# Patient Record
Sex: FEMALE | Race: BLACK OR AFRICAN AMERICAN | Employment: UNEMPLOYED | URBAN - METROPOLITAN AREA
[De-identification: names, ages, dates, MRNs, and addresses within clinical notes are randomized per-mention and may not be internally consistent; named-entity substitution may affect disease eponyms.]

---

## 2019-01-20 ENCOUNTER — HOSPITAL ENCOUNTER (EMERGENCY)
Facility: HOSPITAL | Age: 29
Discharge: HOME | End: 2019-01-20

## 2019-01-20 VITALS
WEIGHT: 165 LBS | HEIGHT: 65 IN | DIASTOLIC BLOOD PRESSURE: 73 MMHG | OXYGEN SATURATION: 100 % | SYSTOLIC BLOOD PRESSURE: 115 MMHG | TEMPERATURE: 98 F | HEART RATE: 100 BPM | RESPIRATION RATE: 20 BRPM | BODY MASS INDEX: 27.49 KG/M2

## 2019-01-20 DIAGNOSIS — L23.2 ALLERGIC CONTACT DERMATITIS DUE TO COSMETICS: Primary | ICD-10-CM

## 2019-01-20 PROCEDURE — 99283 EMERGENCY DEPT VISIT LOW MDM: CPT

## 2019-01-20 RX ORDER — PREDNISONE 20 MG/1
40 TABLET ORAL DAILY
Qty: 8 TABLET | Refills: 0 | Status: SHIPPED | OUTPATIENT
Start: 2019-01-20 | End: 2019-01-24

## 2019-01-20 NOTE — ED INITIAL ASSESSMENT (HPI)
Itching x2 weeks. Thinks she is allergic to the iodine she is working with at her job. No resp distress noted. Denies dyspnea. C/o feeling itchy all over body. Also requesting to be tested for hiv and stds.

## 2019-01-21 NOTE — ED PROVIDER NOTES
Patient Seen in: [facility] AND [facility] Emergency Department    History   CC: allergic reaction   HPI: Kell Ruben 29year old female  who presents to the ER c/o red, itchy rash which has been appearing and resolving been reappearing at other places diff Current:BP (!) 135/93   Pulse 100   Temp 98.3 °F (36.8 °C) (Oral)   Resp 20   Ht 165.1 cm (5' 5\")   Wt 74.8 kg   LMP 01/03/2019 (Exact Date)   SpO2 100%   BMI 27.46 kg/m²         General - Appears well, non-toxic and in NAD  Head - Appears symmetric List    START taking these medications    predniSONE 20 MG Oral Tab  Take 2 tablets (40 mg total) by mouth daily for 4 days.   Qty: 8 tablet Refills: 0

## 2019-01-21 NOTE — ED NOTES
Received pt a/ox3, clear speech, nad, no resp distress, ambulatory with steady gait  Here with c/o per triage. No rash noted upon assessment, no difficulty breathing  Pt reports more vaginal discharge as normal. No foul odor or color.  Reports she changed f

## 2019-06-17 ENCOUNTER — TELEPHONE (OUTPATIENT)
Dept: OBGYN CLINIC | Facility: CLINIC | Age: 29
End: 2019-06-17

## 2019-06-18 NOTE — TELEPHONE ENCOUNTER
Pt states she was offered an apt for July on a Saturday and wants to switch apt.    There is not schedule showing for a nurse education apt in July     Pt wants to speak to a nurse

## 2019-06-18 NOTE — TELEPHONE ENCOUNTER
PT STATES SHE AGREES TO SEE ALL 6 DRS (INCLUDING  MALES). SHE IS AWARE TO COME EARLY TO CHECK IN AND THAT UPT WILL BE DONE AT THE BEGINNING OF THE APPT. SHE IS ALSO AWARE THAT THE FULL HOUR IS NEEDED FOR THE APPT.   ADVISED TO START PN VITAMINS WITH FE, F

## 2019-06-24 ENCOUNTER — NURSE ONLY (OUTPATIENT)
Dept: OBGYN CLINIC | Facility: CLINIC | Age: 29
End: 2019-06-24
Payer: COMMERCIAL

## 2019-06-24 ENCOUNTER — LAB ENCOUNTER (OUTPATIENT)
Dept: LAB | Facility: HOSPITAL | Age: 29
End: 2019-06-24
Attending: OBSTETRICS & GYNECOLOGY
Payer: COMMERCIAL

## 2019-06-24 VITALS — BODY MASS INDEX: 27 KG/M2 | WEIGHT: 162.38 LBS

## 2019-06-24 DIAGNOSIS — Z34.81 ENCOUNTER FOR SUPERVISION OF OTHER NORMAL PREGNANCY IN FIRST TRIMESTER: ICD-10-CM

## 2019-06-24 DIAGNOSIS — Z34.81 ENCOUNTER FOR SUPERVISION OF OTHER NORMAL PREGNANCY IN FIRST TRIMESTER: Primary | ICD-10-CM

## 2019-06-24 PROCEDURE — 86901 BLOOD TYPING SEROLOGIC RH(D): CPT

## 2019-06-24 PROCEDURE — 36415 COLL VENOUS BLD VENIPUNCTURE: CPT

## 2019-06-24 PROCEDURE — 87389 HIV-1 AG W/HIV-1&-2 AB AG IA: CPT

## 2019-06-24 PROCEDURE — 85025 COMPLETE CBC W/AUTO DIFF WBC: CPT

## 2019-06-24 PROCEDURE — 87086 URINE CULTURE/COLONY COUNT: CPT

## 2019-06-24 PROCEDURE — 87340 HEPATITIS B SURFACE AG IA: CPT

## 2019-06-24 PROCEDURE — 86787 VARICELLA-ZOSTER ANTIBODY: CPT

## 2019-06-24 PROCEDURE — 86762 RUBELLA ANTIBODY: CPT

## 2019-06-24 PROCEDURE — 86780 TREPONEMA PALLIDUM: CPT

## 2019-06-24 PROCEDURE — 86850 RBC ANTIBODY SCREEN: CPT

## 2019-06-24 PROCEDURE — 86803 HEPATITIS C AB TEST: CPT

## 2019-06-24 PROCEDURE — 85660 RBC SICKLE CELL TEST: CPT

## 2019-06-24 PROCEDURE — 86900 BLOOD TYPING SEROLOGIC ABO: CPT

## 2019-06-24 RX ORDER — CHOLECALCIFEROL (VITAMIN D3) 25 MCG
1 TABLET,CHEWABLE ORAL DAILY
COMMUNITY
End: 2020-08-06

## 2019-06-24 NOTE — PROGRESS NOTES
Pt seen for OBN appt today with no complaints. Hep C, Varicella, Sicklecell and Normal PN labs ordered. Pt advised all labs must be completed and resulted prior to MD appt.   Assisted pt with scheduling NPN appt with MD.  Pt reports random sharp pain that Drug usage in prior year Yes, marijuana. Stopped when she found she was pregnant. Advised pt on random urine toxicology screening during prenatal care     Alcohol Yes, socially prior to pregnancy    Would you accept a blood transfusion?  If no, are you a

## 2019-07-03 ENCOUNTER — INITIAL PRENATAL (OUTPATIENT)
Dept: OBGYN CLINIC | Facility: CLINIC | Age: 29
End: 2019-07-03
Payer: COMMERCIAL

## 2019-07-03 VITALS
BODY MASS INDEX: 27 KG/M2 | DIASTOLIC BLOOD PRESSURE: 72 MMHG | WEIGHT: 163.63 LBS | SYSTOLIC BLOOD PRESSURE: 111 MMHG | HEART RATE: 94 BPM

## 2019-07-03 DIAGNOSIS — O26.841 UTERINE SIZE-DATE DISCREPANCY IN FIRST TRIMESTER: Primary | ICD-10-CM

## 2019-07-03 DIAGNOSIS — Z34.91 ENCOUNTER FOR SUPERVISION OF NORMAL PREGNANCY IN FIRST TRIMESTER, UNSPECIFIED GRAVIDITY: ICD-10-CM

## 2019-07-03 LAB
MULTISTIX LOT#: NORMAL NUMERIC
PH, URINE: 6 (ref 4.5–8)
SPECIFIC GRAVITY: 1.02 (ref 1–1.03)
UROBILINOGEN,SEMI-QN: 0 MG/DL (ref 0–1.9)

## 2019-07-03 PROCEDURE — 81002 URINALYSIS NONAUTO W/O SCOPE: CPT | Performed by: OBSTETRICS & GYNECOLOGY

## 2019-07-03 PROCEDURE — 76815 OB US LIMITED FETUS(S): CPT | Performed by: OBSTETRICS & GYNECOLOGY

## 2019-07-03 RX ORDER — PREDNISONE 20 MG/1
TABLET ORAL
Refills: 0 | COMMUNITY
Start: 2019-03-12 | End: 2019-08-28

## 2019-07-03 RX ORDER — METRONIDAZOLE 500 MG/1
TABLET ORAL
Refills: 0 | COMMUNITY
Start: 2019-03-12 | End: 2019-08-28

## 2019-07-03 NOTE — PROGRESS NOTES
30 yo  @ 8w5d by unsure LMP of first week of May( think May 3rd or ). Declines genetics. Pap/STD screen today. CRL not c/w LMP (9w4 to 9w6d). Plan for US for dates. RTC 4 wks.

## 2019-07-05 LAB
C TRACH DNA SPEC QL NAA+PROBE: NEGATIVE
N GONORRHOEA DNA SPEC QL NAA+PROBE: NEGATIVE
T VAGINALIS RRNA SPEC QL NAA+PROBE: POSITIVE

## 2019-07-06 ENCOUNTER — HOSPITAL ENCOUNTER (OUTPATIENT)
Dept: ULTRASOUND IMAGING | Age: 29
Discharge: HOME | End: 2019-07-06
Attending: OBSTETRICS & GYNECOLOGY
Payer: COMMERCIAL

## 2019-07-06 DIAGNOSIS — O26.841 UTERINE SIZE-DATE DISCREPANCY IN FIRST TRIMESTER: ICD-10-CM

## 2019-07-06 DIAGNOSIS — Z34.91 ENCOUNTER FOR SUPERVISION OF NORMAL PREGNANCY IN FIRST TRIMESTER, UNSPECIFIED GRAVIDITY: ICD-10-CM

## 2019-07-06 PROCEDURE — 76801 OB US < 14 WKS SINGLE FETUS: CPT | Performed by: OBSTETRICS & GYNECOLOGY

## 2019-07-08 ENCOUNTER — TELEPHONE (OUTPATIENT)
Dept: OBGYN CLINIC | Facility: CLINIC | Age: 29
End: 2019-07-08

## 2019-07-08 NOTE — TELEPHONE ENCOUNTER
C/O CLEAR WATERY DISCHARGE THAT IS NOW CAUSING SOME EXTERNAL IRRITATION AND BURNING FOR ABOUT 1 WEEK. TOLD ES AT [facility] APPT BUT IRRITATION HAS BEEN INCREASING SINCE THEN AND WOULD LIKE TO BE SEEN.   ADVISED IN THE MEANTIME TO KEEP AREA CLEAN AND DRY AND CAN

## 2019-07-08 NOTE — TELEPHONE ENCOUNTER
----- Message from Heriberto Palma DO sent at 7/7/2019  2:57 PM CDT -----  Please notify patient i've changed her DRU. She was unsure of LMP( sometime in the first week of May).   Given the discrepancy between LMP and US, I've changed her DRU to US date

## 2019-07-10 ENCOUNTER — TELEPHONE (OUTPATIENT)
Dept: OBGYN CLINIC | Facility: CLINIC | Age: 29
End: 2019-07-10

## 2019-07-10 PROBLEM — A59.9 TRICHOMONAS INFECTION: Status: ACTIVE | Noted: 2019-07-10

## 2019-07-10 NOTE — TELEPHONE ENCOUNTER
LMTCB. Received call from [address] [place] [address] that pt is + for trich. Pt had appt with KCB this morning at 8:10 for vaginal irritation that she no showed too, so likely has not been informed about the trich yet.  Verbal from [address] [place] [address] for pt to be notified and send Flagyl 2g PO

## 2019-07-10 NOTE — TELEPHONE ENCOUNTER
Another communication is open on this pt, pt will be informed of DRU change when she calls back. Closing encounter.

## 2019-07-10 NOTE — TELEPHONE ENCOUNTER
Also pt needs to be informed that her DRU has changed. There is another commin the inbox with this info.

## 2019-07-11 RX ORDER — METRONIDAZOLE 500 MG/1
2000 TABLET ORAL ONCE
Qty: 4 TABLET | Refills: 0 | Status: SHIPPED | OUTPATIENT
Start: 2019-07-11 | End: 2019-07-11

## 2019-07-13 ENCOUNTER — TELEPHONE (OUTPATIENT)
Dept: OBGYN CLINIC | Facility: CLINIC | Age: 29
End: 2019-07-13

## 2019-07-13 RX ORDER — METRONIDAZOLE 500 MG/1
2000 TABLET ORAL ONCE
Qty: 4 TABLET | Refills: 0 | Status: SHIPPED | OUTPATIENT
Start: 2019-07-13 | End: 2019-07-13

## 2019-07-13 NOTE — TELEPHONE ENCOUNTER
Rx ordered for Flagyl and sent to pt Pharmacy. Pt aware Rx sent to Pharmacy. Pt verbalized understanding.

## 2019-07-15 ENCOUNTER — TELEPHONE (OUTPATIENT)
Dept: OBGYN CLINIC | Facility: CLINIC | Age: 29
End: 2019-07-15

## 2019-07-15 NOTE — TELEPHONE ENCOUNTER
Pt is 11w3d, reports she took Flagyl 2grams on Saturday and started with upset stomach yesterday. Reports loose BM x2 this morning. Also reports lower abdominal cramping. Denies vaginal bleeding and spotting. Also denies fever, chills and body aches.  Sally Hard

## 2019-08-03 ENCOUNTER — ROUTINE PRENATAL (OUTPATIENT)
Dept: OBGYN CLINIC | Facility: CLINIC | Age: 29
End: 2019-08-03
Payer: COMMERCIAL

## 2019-08-03 VITALS
DIASTOLIC BLOOD PRESSURE: 79 MMHG | SYSTOLIC BLOOD PRESSURE: 115 MMHG | HEART RATE: 89 BPM | WEIGHT: 168.63 LBS | BODY MASS INDEX: 28 KG/M2

## 2019-08-03 DIAGNOSIS — Z34.92 ENCOUNTER FOR SUPERVISION OF NORMAL PREGNANCY IN SECOND TRIMESTER, UNSPECIFIED GRAVIDITY: Primary | ICD-10-CM

## 2019-08-03 LAB
MULTISTIX LOT#: NORMAL NUMERIC
PH, URINE: 5 (ref 4.5–8)
SPECIFIC GRAVITY: 1 (ref 1–1.03)
UROBILINOGEN,SEMI-QN: 0 MG/DL (ref 0–1.9)

## 2019-08-03 PROCEDURE — 81002 URINALYSIS NONAUTO W/O SCOPE: CPT | Performed by: OBSTETRICS & GYNECOLOGY

## 2019-08-28 ENCOUNTER — ROUTINE PRENATAL (OUTPATIENT)
Dept: OBGYN CLINIC | Facility: CLINIC | Age: 29
End: 2019-08-28
Payer: COMMERCIAL

## 2019-08-28 VITALS
DIASTOLIC BLOOD PRESSURE: 68 MMHG | SYSTOLIC BLOOD PRESSURE: 110 MMHG | WEIGHT: 172.63 LBS | BODY MASS INDEX: 29 KG/M2 | HEART RATE: 94 BPM

## 2019-08-28 DIAGNOSIS — Z34.82 ENCOUNTER FOR SUPERVISION OF OTHER NORMAL PREGNANCY IN SECOND TRIMESTER: Primary | ICD-10-CM

## 2019-08-28 DIAGNOSIS — Z20.2 TRICHOMONAS CONTACT, TREATED: ICD-10-CM

## 2019-08-28 PROBLEM — Z87.898 HISTORY OF SUBSTANCE USE: Status: ACTIVE | Noted: 2019-08-28

## 2019-08-28 LAB
APPEARANCE: CLEAR
MULTISTIX LOT#: NORMAL NUMERIC
PH, URINE: 7 (ref 4.5–8)
SPECIFIC GRAVITY: 1.01 (ref 1–1.03)
URINE-COLOR: YELLOW
UROBILINOGEN,SEMI-QN: 0.2 MG/DL (ref 0–1.9)

## 2019-08-28 PROCEDURE — 81002 URINALYSIS NONAUTO W/O SCOPE: CPT | Performed by: OBSTETRICS & GYNECOLOGY

## 2019-08-28 NOTE — PROGRESS NOTES
Declined genetics. Order for 20 wk u/s. Finished flagyl and partner was treated as well. GERMÁN trich. RTC 4 wk.

## 2019-08-31 LAB
CANDIDA SCREEN: NEGATIVE
GENITAL VAGINOSIS SCREEN: NEGATIVE
TRICHOMONAS SCREEN: NEGATIVE

## 2019-09-04 ENCOUNTER — HOSPITAL ENCOUNTER (OUTPATIENT)
Dept: ULTRASOUND IMAGING | Age: 29
Discharge: HOME | End: 2019-09-04
Attending: OBSTETRICS & GYNECOLOGY
Payer: MEDICAID

## 2019-09-04 DIAGNOSIS — Z34.82 ENCOUNTER FOR SUPERVISION OF OTHER NORMAL PREGNANCY IN SECOND TRIMESTER: ICD-10-CM

## 2019-09-04 PROCEDURE — 76805 OB US >/= 14 WKS SNGL FETUS: CPT | Performed by: OBSTETRICS & GYNECOLOGY

## 2019-09-09 ENCOUNTER — TELEPHONE (OUTPATIENT)
Dept: OBGYN CLINIC | Facility: CLINIC | Age: 29
End: 2019-09-09

## 2019-09-09 NOTE — TELEPHONE ENCOUNTER
Pt. States that she is feeling fluttering and wonders when she will feel stronger movement. Pt.informed that she will gradually start feeling the baby move more as she gets farther along.  Typically the stronger movements happen after 26 wks, but each perso

## 2019-09-18 ENCOUNTER — TELEPHONE (OUTPATIENT)
Dept: OBGYN CLINIC | Facility: CLINIC | Age: 29
End: 2019-09-18

## 2019-09-18 ENCOUNTER — ROUTINE PRENATAL (OUTPATIENT)
Dept: OBGYN CLINIC | Facility: CLINIC | Age: 29
End: 2019-09-18
Payer: MEDICAID

## 2019-09-18 VITALS
SYSTOLIC BLOOD PRESSURE: 106 MMHG | WEIGHT: 178 LBS | DIASTOLIC BLOOD PRESSURE: 69 MMHG | BODY MASS INDEX: 30 KG/M2 | HEART RATE: 97 BPM

## 2019-09-18 DIAGNOSIS — Z34.82 ENCOUNTER FOR SUPERVISION OF OTHER NORMAL PREGNANCY IN SECOND TRIMESTER: Primary | ICD-10-CM

## 2019-09-18 LAB
APPEARANCE: CLEAR
MULTISTIX LOT#: NORMAL NUMERIC

## 2019-09-18 PROCEDURE — 0502F SUBSEQUENT PRENATAL CARE: CPT | Performed by: OBSTETRICS & GYNECOLOGY

## 2019-09-18 PROCEDURE — 81002 URINALYSIS NONAUTO W/O SCOPE: CPT | Performed by: OBSTETRICS & GYNECOLOGY

## 2019-09-18 NOTE — TELEPHONE ENCOUNTER
Pt is 20 weeks. States she is expierencing sharp stabbing pain in stomach that comes and goes when sitting down. States she started a new job and stands a lot. Please advise.

## 2019-09-18 NOTE — TELEPHONE ENCOUNTER
Pt denies pain today. Pt states that she had pain yesterday and last week. She states that the pain is intermittent. It is rated as a 5/10. When she has the pain it is around the belly button and below to the vagina area. It is a sharp pain.    She state

## 2019-09-19 NOTE — PROGRESS NOTES
Girl. Reviewed normal Level 1. 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 Some cramps described in bilateral inguinal area and pubic bone soreness with prolonged standing. No LOF, increased DC or blood. Guidance given.

## 2019-09-24 ENCOUNTER — TELEPHONE (OUTPATIENT)
Dept: OBGYN CLINIC | Facility: CLINIC | Age: 29
End: 2019-09-24

## 2019-09-24 NOTE — TELEPHONE ENCOUNTER
C/O CRAMPING THROUGHOUT WHOLE BELLY THAT BEGAN ABOUT 40 MINUTES AGO, DOES NOT FEEL LIKE CONTRACTIONS. STATES SHE FEELS BABY MOVING. DENIES ANY LEAKING OR SPOTTING. JUST GOT HOME ABOUT AN HOUR AGO. ONLY HAD 1 BOTTLE OF WATER SO FAR TODAY.   HAD NORMAL BM

## 2019-09-30 ENCOUNTER — ROUTINE PRENATAL (OUTPATIENT)
Dept: OBGYN CLINIC | Facility: CLINIC | Age: 29
End: 2019-09-30
Payer: MEDICAID

## 2019-09-30 VITALS
WEIGHT: 177.63 LBS | BODY MASS INDEX: 30 KG/M2 | SYSTOLIC BLOOD PRESSURE: 111 MMHG | HEART RATE: 90 BPM | DIASTOLIC BLOOD PRESSURE: 74 MMHG

## 2019-09-30 DIAGNOSIS — Z34.92 ENCOUNTER FOR SUPERVISION OF NORMAL PREGNANCY IN SECOND TRIMESTER, UNSPECIFIED GRAVIDITY: Primary | ICD-10-CM

## 2019-09-30 LAB
AMPHET UR QL SCN: NEGATIVE
BARBITURATES UR QL SCN: NEGATIVE
BENZODIAZ UR QL SCN: NEGATIVE
CANNABINOIDS UR QL SCN: NEGATIVE
COCAINE UR QL: NEGATIVE
MDMA UR QL SCN: NEGATIVE
METHADONE UR QL SCN: NEGATIVE
MULTISTIX LOT#: NORMAL NUMERIC
OPIATES UR QL SCN: NEGATIVE
OXYCODONE UR QL SCN: NEGATIVE
PCP UR QL SCN: NEGATIVE
PH, URINE: 7 (ref 4.5–8)
SPECIFIC GRAVITY: 1 (ref 1–1.03)
UROBILINOGEN,SEMI-QN: 0 MG/DL (ref 0–1.9)

## 2019-09-30 PROCEDURE — 90686 IIV4 VACC NO PRSV 0.5 ML IM: CPT | Performed by: OBSTETRICS & GYNECOLOGY

## 2019-09-30 PROCEDURE — 0502F SUBSEQUENT PRENATAL CARE: CPT | Performed by: OBSTETRICS & GYNECOLOGY

## 2019-09-30 PROCEDURE — 90471 IMMUNIZATION ADMIN: CPT | Performed by: OBSTETRICS & GYNECOLOGY

## 2019-09-30 PROCEDURE — 81002 URINALYSIS NONAUTO W/O SCOPE: CPT | Performed by: OBSTETRICS & GYNECOLOGY

## 2019-09-30 NOTE — PROGRESS NOTES
Flu vaccine given to pt's left deltoid. Pt tolerated well. Screening checklist and consent filled out and signed by pt. Info sheet given.

## 2019-10-01 ENCOUNTER — TELEPHONE (OUTPATIENT)
Dept: OBGYN CLINIC | Facility: CLINIC | Age: 29
End: 2019-10-01

## 2019-10-01 NOTE — TELEPHONE ENCOUNTER
PER PATIENT REQUESTING AN NOTE THAT STATE SHE'S ON BED REST / PT STATE SHE NEED THIS TO GET DISABILITY / NEED THIS UP UNTIL January 2020 / Fei Gallegos

## 2019-10-01 NOTE — TELEPHONE ENCOUNTER
STATES SHE HAS PAIN WHEN SHE IS ON HER FEET WHILE WORKING AND WANTS TO BE OFF WORK. BUT WILL NEED LETTER STATING SHE IS ON BEDREST TO QUALIFY FOR DISABILITY.   ADVISED OUR DRS WILL NOT PROVIDE A LETTER UNLESS THERE IS A MEDICAL REASON FOR IT.  SUGGESTED GE

## 2019-10-16 ENCOUNTER — OFFICE VISIT (OUTPATIENT)
Dept: OBGYN CLINIC | Facility: CLINIC | Age: 29
End: 2019-10-16
Payer: MEDICAID

## 2019-10-16 VITALS
BODY MASS INDEX: 30 KG/M2 | DIASTOLIC BLOOD PRESSURE: 65 MMHG | WEIGHT: 181.19 LBS | SYSTOLIC BLOOD PRESSURE: 104 MMHG | HEART RATE: 89 BPM

## 2019-10-16 DIAGNOSIS — L72.3 SEBACEOUS CYST: Primary | ICD-10-CM

## 2019-10-16 DIAGNOSIS — Z34.92 ENCOUNTER FOR SUPERVISION OF NORMAL PREGNANCY IN SECOND TRIMESTER, UNSPECIFIED GRAVIDITY: ICD-10-CM

## 2019-10-16 PROCEDURE — 81002 URINALYSIS NONAUTO W/O SCOPE: CPT | Performed by: OBSTETRICS & GYNECOLOGY

## 2019-10-16 PROCEDURE — 99213 OFFICE O/P EST LOW 20 MIN: CPT | Performed by: OBSTETRICS & GYNECOLOGY

## 2019-10-16 NOTE — PROGRESS NOTES
Prenatal pt c/o right labial bump. Noticed 3 days ago. Not painful. No abnormal discharge. VSS. FH 24 cm. . Genital exam shows small sebaceous cyst and white material expressed. Recommend warm compresses. 2T labs given to patient.   RTC 2 wk

## 2019-10-26 ENCOUNTER — ROUTINE PRENATAL (OUTPATIENT)
Dept: OBGYN CLINIC | Facility: CLINIC | Age: 29
End: 2019-10-26
Payer: MEDICAID

## 2019-10-26 ENCOUNTER — APPOINTMENT (OUTPATIENT)
Dept: LAB | Facility: HOSPITAL | Age: 29
End: 2019-10-26
Attending: OBSTETRICS & GYNECOLOGY
Payer: MEDICAID

## 2019-10-26 VITALS
WEIGHT: 182 LBS | BODY MASS INDEX: 30 KG/M2 | DIASTOLIC BLOOD PRESSURE: 69 MMHG | SYSTOLIC BLOOD PRESSURE: 110 MMHG | HEART RATE: 114 BPM

## 2019-10-26 DIAGNOSIS — Z34.92 ENCOUNTER FOR SUPERVISION OF NORMAL PREGNANCY IN SECOND TRIMESTER, UNSPECIFIED GRAVIDITY: ICD-10-CM

## 2019-10-26 DIAGNOSIS — Z34.82 ENCOUNTER FOR SUPERVISION OF OTHER NORMAL PREGNANCY IN SECOND TRIMESTER: Primary | ICD-10-CM

## 2019-10-26 PROCEDURE — 81002 URINALYSIS NONAUTO W/O SCOPE: CPT | Performed by: OBSTETRICS & GYNECOLOGY

## 2019-10-26 PROCEDURE — 36415 COLL VENOUS BLD VENIPUNCTURE: CPT

## 2019-10-26 PROCEDURE — 82950 GLUCOSE TEST: CPT

## 2019-10-26 PROCEDURE — 85027 COMPLETE CBC AUTOMATED: CPT

## 2019-10-26 PROCEDURE — 0502F SUBSEQUENT PRENATAL CARE: CPT | Performed by: OBSTETRICS & GYNECOLOGY

## 2019-11-07 ENCOUNTER — ROUTINE PRENATAL (OUTPATIENT)
Dept: OBGYN CLINIC | Facility: CLINIC | Age: 29
End: 2019-11-07
Payer: MEDICAID

## 2019-11-07 VITALS
HEART RATE: 103 BPM | DIASTOLIC BLOOD PRESSURE: 79 MMHG | WEIGHT: 182 LBS | SYSTOLIC BLOOD PRESSURE: 132 MMHG | BODY MASS INDEX: 30 KG/M2

## 2019-11-07 DIAGNOSIS — Z34.93 ENCOUNTER FOR SUPERVISION OF NORMAL PREGNANCY IN THIRD TRIMESTER, UNSPECIFIED GRAVIDITY: Primary | ICD-10-CM

## 2019-11-07 PROCEDURE — 0502F SUBSEQUENT PRENATAL CARE: CPT | Performed by: OBSTETRICS & GYNECOLOGY

## 2019-11-07 PROCEDURE — 81002 URINALYSIS NONAUTO W/O SCOPE: CPT | Performed by: OBSTETRICS & GYNECOLOGY

## 2019-11-20 ENCOUNTER — ROUTINE PRENATAL (OUTPATIENT)
Dept: OBGYN CLINIC | Facility: CLINIC | Age: 29
End: 2019-11-20
Payer: MEDICAID

## 2019-11-20 VITALS
HEART RATE: 98 BPM | BODY MASS INDEX: 31 KG/M2 | WEIGHT: 185 LBS | SYSTOLIC BLOOD PRESSURE: 111 MMHG | DIASTOLIC BLOOD PRESSURE: 70 MMHG

## 2019-11-20 DIAGNOSIS — Z34.83 ENCOUNTER FOR SUPERVISION OF OTHER NORMAL PREGNANCY IN THIRD TRIMESTER: Primary | ICD-10-CM

## 2019-11-20 DIAGNOSIS — O42.90 AMNIOTIC FLUID LEAKING: ICD-10-CM

## 2019-11-20 PROCEDURE — 90715 TDAP VACCINE 7 YRS/> IM: CPT | Performed by: OBSTETRICS & GYNECOLOGY

## 2019-11-20 PROCEDURE — 0502F SUBSEQUENT PRENATAL CARE: CPT | Performed by: OBSTETRICS & GYNECOLOGY

## 2019-11-20 PROCEDURE — 87210 SMEAR WET MOUNT SALINE/INK: CPT | Performed by: OBSTETRICS & GYNECOLOGY

## 2019-11-20 PROCEDURE — 81002 URINALYSIS NONAUTO W/O SCOPE: CPT | Performed by: OBSTETRICS & GYNECOLOGY

## 2019-11-20 PROCEDURE — 90471 IMMUNIZATION ADMIN: CPT | Performed by: OBSTETRICS & GYNECOLOGY

## 2019-11-21 NOTE — PROGRESS NOTES
She c/o occasional \"wetness\" since last vist. Last time was yesterday and none today. No other S/S of PTL. No urinary complaints. Spec shows scant mucoid DC and it is nitrazine and fern negative. Guidance given.

## 2019-12-04 ENCOUNTER — ROUTINE PRENATAL (OUTPATIENT)
Dept: OBGYN CLINIC | Facility: CLINIC | Age: 29
End: 2019-12-04
Payer: MEDICAID

## 2019-12-04 VITALS
SYSTOLIC BLOOD PRESSURE: 124 MMHG | HEART RATE: 105 BPM | WEIGHT: 188 LBS | BODY MASS INDEX: 31 KG/M2 | DIASTOLIC BLOOD PRESSURE: 75 MMHG

## 2019-12-04 DIAGNOSIS — Z34.93 ENCOUNTER FOR SUPERVISION OF NORMAL PREGNANCY IN THIRD TRIMESTER, UNSPECIFIED GRAVIDITY: Primary | ICD-10-CM

## 2019-12-04 PROCEDURE — 0502F SUBSEQUENT PRENATAL CARE: CPT | Performed by: OBSTETRICS & GYNECOLOGY

## 2019-12-04 PROCEDURE — 81002 URINALYSIS NONAUTO W/O SCOPE: CPT | Performed by: OBSTETRICS & GYNECOLOGY

## 2019-12-05 NOTE — PROGRESS NOTES
Quentin and Isabel at visit. Payton Cruz c/o URI symptoms x 1 week. Discussed OTC meds. No S/S of PTL.

## 2019-12-16 ENCOUNTER — TELEPHONE (OUTPATIENT)
Dept: OBGYN CLINIC | Facility: CLINIC | Age: 29
End: 2019-12-16

## 2019-12-16 ENCOUNTER — OFFICE VISIT (OUTPATIENT)
Dept: OBGYN CLINIC | Facility: CLINIC | Age: 29
End: 2019-12-16
Payer: MEDICAID

## 2019-12-16 DIAGNOSIS — Z34.80 SUPERVISION OF OTHER NORMAL PREGNANCY: Primary | ICD-10-CM

## 2019-12-16 DIAGNOSIS — Z71.89 PRENATAL CONSULT: Primary | ICD-10-CM

## 2019-12-16 NOTE — TELEPHONE ENCOUNTER
Per [facility] ARABELLA, pt ok to transfer. Notified pt, scheduled NOB appt w/ cnm's & advised 3rd trimester HIV & trep need to be completed. Pt verbalized an understanding & agrees w/ plan. Pt called back & requested repeat trich screening.  Advised pt she can ask MES

## 2019-12-16 NOTE — PROGRESS NOTES
Patient presented for meet and greet visit as she is interested in natural childbirth/waterbirth.   She reported her baby didn't respond well after she received an epidural with last delivery and it was unclear weather she received turbutaline or ephedrine

## 2019-12-18 ENCOUNTER — INITIAL PRENATAL (OUTPATIENT)
Dept: OBGYN CLINIC | Facility: CLINIC | Age: 29
End: 2019-12-18
Payer: MEDICAID

## 2019-12-18 ENCOUNTER — LAB ENCOUNTER (OUTPATIENT)
Dept: LAB | Facility: HOSPITAL | Age: 29
End: 2019-12-18
Attending: ADVANCED PRACTICE MIDWIFE
Payer: MEDICAID

## 2019-12-18 VITALS
WEIGHT: 194 LBS | DIASTOLIC BLOOD PRESSURE: 70 MMHG | BODY MASS INDEX: 32 KG/M2 | HEART RATE: 80 BPM | SYSTOLIC BLOOD PRESSURE: 120 MMHG

## 2019-12-18 DIAGNOSIS — Z34.80 SUPERVISION OF OTHER NORMAL PREGNANCY: ICD-10-CM

## 2019-12-18 DIAGNOSIS — Z34.83 ENCOUNTER FOR SUPERVISION OF OTHER NORMAL PREGNANCY IN THIRD TRIMESTER: Primary | ICD-10-CM

## 2019-12-18 PROCEDURE — 36415 COLL VENOUS BLD VENIPUNCTURE: CPT

## 2019-12-18 PROCEDURE — 87205 SMEAR GRAM STAIN: CPT | Performed by: ADVANCED PRACTICE MIDWIFE

## 2019-12-18 PROCEDURE — 87389 HIV-1 AG W/HIV-1&-2 AB AG IA: CPT

## 2019-12-18 PROCEDURE — 86780 TREPONEMA PALLIDUM: CPT

## 2019-12-18 PROCEDURE — 81002 URINALYSIS NONAUTO W/O SCOPE: CPT | Performed by: ADVANCED PRACTICE MIDWIFE

## 2019-12-18 PROCEDURE — 0502F SUBSEQUENT PRENATAL CARE: CPT | Performed by: ADVANCED PRACTICE MIDWIFE

## 2019-12-18 PROCEDURE — 87808 TRICHOMONAS ASSAY W/OPTIC: CPT | Performed by: ADVANCED PRACTICE MIDWIFE

## 2019-12-18 PROCEDURE — 87106 FUNGI IDENTIFICATION YEAST: CPT | Performed by: ADVANCED PRACTICE MIDWIFE

## 2019-12-18 NOTE — PROGRESS NOTES
Active fetus  No signs signs of PTL. Reviewed S&S of PTL  Warning signs reviewed  All questions answered. Vaginal culture for trich sent.

## 2019-12-19 ENCOUNTER — TELEPHONE (OUTPATIENT)
Dept: OBGYN CLINIC | Facility: CLINIC | Age: 29
End: 2019-12-19

## 2019-12-19 DIAGNOSIS — Z34.83 SUPERVISION OF NORMAL INTRAUTERINE PREGNANCY IN MULTIGRAVIDA, THIRD TRIMESTER: Primary | ICD-10-CM

## 2019-12-23 ENCOUNTER — TELEPHONE (OUTPATIENT)
Dept: OBGYN CLINIC | Facility: CLINIC | Age: 29
End: 2019-12-23

## 2019-12-23 NOTE — TELEPHONE ENCOUNTER
----- Message from Donny Mcnally CNM sent at 12/23/2019 10:47 AM CST -----  Screen positive for yeast.  I put in rx

## 2019-12-23 NOTE — TELEPHONE ENCOUNTER
Notified pt of results & instructions per MES. Pharmacy confirmed.  Pt verbalized an understanding & agrees w/ plan

## 2019-12-26 ENCOUNTER — TELEPHONE (OUTPATIENT)
Dept: OBGYN CLINIC | Facility: CLINIC | Age: 29
End: 2019-12-26

## 2019-12-26 ENCOUNTER — HOSPITAL ENCOUNTER (OUTPATIENT)
Facility: HOSPITAL | Age: 29
Setting detail: OBSERVATION
Discharge: HOME | End: 2019-12-26
Attending: ADVANCED PRACTICE MIDWIFE | Admitting: OBSTETRICS & GYNECOLOGY
Payer: MEDICAID

## 2019-12-26 VITALS
RESPIRATION RATE: 16 BRPM | HEART RATE: 103 BPM | TEMPERATURE: 98 F | SYSTOLIC BLOOD PRESSURE: 116 MMHG | DIASTOLIC BLOOD PRESSURE: 70 MMHG

## 2019-12-26 PROBLEM — Z34.90 PREGNANCY: Status: ACTIVE | Noted: 2019-12-26

## 2019-12-26 PROBLEM — Z34.90 PREGNANCY (HCC): Status: ACTIVE | Noted: 2019-12-26

## 2019-12-26 PROCEDURE — 99213 OFFICE O/P EST LOW 20 MIN: CPT

## 2019-12-26 PROCEDURE — 59025 FETAL NON-STRESS TEST: CPT

## 2019-12-26 NOTE — TRIAGE
[facility] - [facility]      Triage Note    Lynne Economy Patient Status:  Observation    1990 MRN [id number]   Location [address] [facility] Attending Akbar Bond, [address] Day # 0 PCP No primary care provider on pablo tomorrow in the office. Discharge education provided. All questions answered. Pr verbalizes understanding.       Willy Vivar RN  12/26/2019 5:43 PM    Physician Evaluation      NST Interpretation: Reactive    Disposition:   Discharged    Comments:        Marnie Servin

## 2019-12-26 NOTE — TELEPHONE ENCOUNTER
PER PATIENT REQUESTING A NOTE THAT STATE SHE'S PREGNANT / THAT SHE WAS SEEN TODAY AND TREATED FOR MUSCLE PAIN / PT WOULD LIKE TO  AT [facility] / PT STATE SHE NEED THIS FOR TOMORROW / TO BE EXCUSED FORM WORK TODAY / PT ALSO LIKE A CALL WHEN Bashir Hurley / Deepthi Meneses

## 2019-12-26 NOTE — PROGRESS NOTES
Pt is a 34year old female admitted to TR2/TR2-A. Patient presents with:  R/o Labor: pt feels muscle aches and lower abdominal tenderness since this am     Pt is  34w6d intra-uterine pregnancy. History obtained, consents signed.  Oriented to room,

## 2019-12-26 NOTE — TELEPHONE ENCOUNTER
Pt came here to  note. Had muscle pain this a.m. & couldn't go in to work. Per employer, pt needs note to be excused. Advised pt we cannot give her a note w/o being seen, but also cannot guarantee she would get a note.  Pt states she is going to the

## 2019-12-27 ENCOUNTER — ROUTINE PRENATAL (OUTPATIENT)
Dept: OBGYN CLINIC | Facility: CLINIC | Age: 29
End: 2019-12-27
Payer: MEDICAID

## 2019-12-27 VITALS
WEIGHT: 193.63 LBS | SYSTOLIC BLOOD PRESSURE: 112 MMHG | BODY MASS INDEX: 32 KG/M2 | HEART RATE: 101 BPM | DIASTOLIC BLOOD PRESSURE: 79 MMHG

## 2019-12-27 DIAGNOSIS — Z34.83 ENCOUNTER FOR SUPERVISION OF OTHER NORMAL PREGNANCY IN THIRD TRIMESTER: Primary | ICD-10-CM

## 2019-12-27 DIAGNOSIS — O36.63X0 EXCESSIVE FETAL GROWTH AFFECTING MANAGEMENT OF PREGNANCY IN THIRD TRIMESTER, SINGLE OR UNSPECIFIED FETUS: ICD-10-CM

## 2019-12-27 PROCEDURE — 0502F SUBSEQUENT PRENATAL CARE: CPT | Performed by: ADVANCED PRACTICE MIDWIFE

## 2019-12-27 PROCEDURE — 81002 URINALYSIS NONAUTO W/O SCOPE: CPT | Performed by: ADVANCED PRACTICE MIDWIFE

## 2019-12-30 NOTE — PROGRESS NOTES
Baby active. No signs PTL. Seen in triage for not feeling well and achiness. Is better today. No flank tenderness, no UTI symptoms, no sick contacts. Reactive NST yesterday. Likely musculoskeletal and normal pregnancy discomforts.  Growth u/s ordered as S>D

## 2019-12-31 ENCOUNTER — TELEPHONE (OUTPATIENT)
Dept: OBGYN CLINIC | Facility: CLINIC | Age: 29
End: 2019-12-31

## 2019-12-31 NOTE — TELEPHONE ENCOUNTER
Patient has an appointment at [facility] on 01/03 for Leslie Kingsley (cpt [id number]). Her [employer] would like a pre auth from [place]. Please call [phone] to obtain pre auth. Thank you very much.

## 2020-01-02 ENCOUNTER — ROUTINE PRENATAL (OUTPATIENT)
Dept: OBGYN CLINIC | Facility: CLINIC | Age: 30
End: 2020-01-02
Payer: MEDICAID

## 2020-01-02 VITALS
WEIGHT: 194 LBS | SYSTOLIC BLOOD PRESSURE: 120 MMHG | HEIGHT: 65 IN | BODY MASS INDEX: 32.32 KG/M2 | DIASTOLIC BLOOD PRESSURE: 81 MMHG | HEART RATE: 125 BPM

## 2020-01-02 DIAGNOSIS — Z34.83 PRENATAL CARE, SUBSEQUENT PREGNANCY, THIRD TRIMESTER: Primary | ICD-10-CM

## 2020-01-02 LAB
APPEARANCE: CLEAR
MULTISTIX LOT#: NORMAL NUMERIC
PH, URINE: 6 (ref 4.5–8)
SPECIFIC GRAVITY: 1.01 (ref 1–1.03)
URINE-COLOR: YELLOW
UROBILINOGEN,SEMI-QN: 0.2 MG/DL (ref 0–1.9)

## 2020-01-02 PROCEDURE — 0502F SUBSEQUENT PRENATAL CARE: CPT | Performed by: ADVANCED PRACTICE MIDWIFE

## 2020-01-02 PROCEDURE — 81002 URINALYSIS NONAUTO W/O SCOPE: CPT | Performed by: ADVANCED PRACTICE MIDWIFE

## 2020-01-02 RX ORDER — FLUCONAZOLE 150 MG/1
150 TABLET ORAL ONCE
Qty: 1 TABLET | Refills: 0 | Status: SHIPPED | OUTPATIENT
Start: 2020-01-02 | End: 2020-01-02

## 2020-01-03 ENCOUNTER — HOSPITAL ENCOUNTER (OUTPATIENT)
Dept: ULTRASOUND IMAGING | Facility: HOSPITAL | Age: 30
Discharge: HOME | End: 2020-01-03
Attending: ADVANCED PRACTICE MIDWIFE
Payer: MEDICAID

## 2020-01-03 DIAGNOSIS — O36.63X0 EXCESSIVE FETAL GROWTH AFFECTING MANAGEMENT OF PREGNANCY IN THIRD TRIMESTER, SINGLE OR UNSPECIFIED FETUS: ICD-10-CM

## 2020-01-03 PROCEDURE — 76816 OB US FOLLOW-UP PER FETUS: CPT | Performed by: ADVANCED PRACTICE MIDWIFE

## 2020-01-05 NOTE — PROGRESS NOTES
Pt feels well, denies complaints except cont vaginal yeast symptoms despite doing 7-d topical treatment. curdy d/c noted on introitus. RX Fluconazole 150 mg po x 1 dose. Pt has growth ultrasound ordered for tomorrow for s>d.  +FM. GBS done.  Danny Daniels

## 2020-01-08 ENCOUNTER — TELEPHONE (OUTPATIENT)
Dept: OBGYN CLINIC | Facility: CLINIC | Age: 30
End: 2020-01-08

## 2020-01-08 DIAGNOSIS — O35.8XX0 SHORT FEMUR OF FETUS ON PRENATAL ULTRASOUND: Primary | ICD-10-CM

## 2020-01-08 PROBLEM — O35.HXX0 SHORT FEMUR OF FETUS ON PRENATAL ULTRASOUND (HCC): Status: ACTIVE | Noted: 2020-01-08

## 2020-01-08 PROBLEM — O35.HXX0 SHORT FEMUR OF FETUS ON PRENATAL ULTRASOUND: Status: ACTIVE | Noted: 2020-01-08

## 2020-01-08 NOTE — TELEPHONE ENCOUNTER
PC to patient, notified of u/s findings, overall normal growth @ 40%, however lagging femur length at 8%. Reports she is 5'5' and  10' 2' and their first daughter is also tall. Recommend Level 2 and consult with MFM.  Will place referral and BC commun

## 2020-01-08 NOTE — TELEPHONE ENCOUNTER
Notified pt of phone # of MFM to schedule u/s & consultation. Advised pt that the the PA has been sent for medical review & we cannot guarantee it will be authorized. The appt may have to be cancelled if denied or not authorized in time.  Pt verbalized an u

## 2020-01-08 NOTE — TELEPHONE ENCOUNTER
RN's I talked with pt but can you please call her with contact info for MFM. Pt has [employer] so will need prior auth from them for level 2.  Thanks

## 2020-01-09 ENCOUNTER — TELEPHONE (OUTPATIENT)
Dept: OBGYN CLINIC | Facility: CLINIC | Age: 30
End: 2020-01-09

## 2020-01-09 ENCOUNTER — TELEPHONE (OUTPATIENT)
Dept: PERINATAL CARE | Facility: HOSPITAL | Age: 30
End: 2020-01-09

## 2020-01-09 NOTE — TELEPHONE ENCOUNTER
PT CAME IN AND WANTS TO GET A TRANSFER OF CARE FROM THE [facility] DEPT. PT IS NOT SATISFIED WITH HER CARE. PLEASE CONTACT PT ASAP, SHE JUST LEFT [facility] AND SHE WANTS TO CONTINUE CARE WITH OUR PROVIDERS ASAP.

## 2020-01-09 NOTE — TELEPHONE ENCOUNTER
Pt notified per MFM that PA was denied. Advised pt that PA was denied but no correspondence as to reason yet. Explained I cannot control what her insurance will & will not approve.  Pt stated her insurance allowed her to have an u/s last week & would like t

## 2020-01-09 NOTE — TELEPHONE ENCOUNTER
STAFF NOTIFIED PT THAT WE WILL HAVE TO HAVE THE DRS REVIEW HER RECORDS AND ACCEPT HER AND THAT SHE NEEDS TO CONTINUE CARE WITH HER CURRENT PROVIDERS UNTIL SHE IS ACCEPTED BY US.

## 2020-01-09 NOTE — TELEPHONE ENCOUNTER
Patient notified that we are unable to schedule appt due to lack of  authorization for Level 2 U/S.   Authorization was denied per OB office

## 2020-01-09 NOTE — TELEPHONE ENCOUNTER
Per Pam Horton at [facility] she is aware pt needs asap appt and is working on getting her in.

## 2020-01-09 NOTE — TELEPHONE ENCOUNTER
Pt states she tried sched u/s with MFM states they are fully booked pt want to know what she is to do because she need appt asap.

## 2020-01-09 NOTE — TELEPHONE ENCOUNTER
Spoke w/ Cara Medina w/ [facility]. Viewed [employer] website & PA was denied but no correspondence available at this time.  Cara Medina will contact pt & let her know that they will not schedule her PA

## 2020-01-10 NOTE — TELEPHONE ENCOUNTER
Per MAHESH and CHARLI, ok for pt to return to us. No OBN is needed since pt did that with us already. Sofia Ibarra states to make ist appt back a 20 min so they can go over her records. Appt made on 1/13/20 at 5:20pm with MAHESH.   Pt states she was told her pregnancy was

## 2020-01-11 ENCOUNTER — TELEPHONE (OUTPATIENT)
Dept: OBGYN CLINIC | Facility: CLINIC | Age: 30
End: 2020-01-11

## 2020-01-11 NOTE — TELEPHONE ENCOUNTER
Discussed with opt that her insurance did not approve her Level 2 but Dr Anton Copeland reviewed results and does no feel patient needs additional testing. Pt was angry that Level 2 u/s was completed. PC became disconnected or pt hung up.   Pt transferring back to [facility]

## 2020-01-11 NOTE — TELEPHONE ENCOUNTER
Spoke with pt and advised of BE's and Dr. Yosef ring's. Pt voiced understanding. Pt states she will be transferring back to 2nd floor OB's due to being unsatisfied with care received by CNMs and receiving conflicting explanation of U/S results.  Pt also up

## 2020-01-11 NOTE — TELEPHONE ENCOUNTER
----- Message from Salma Alejandre CNM sent at 1/10/2020 10:07 PM CST -----  Regarding: FW:  Please call pt and let her know that I had MFM review her u/s. Dr Fredo Braswell looked at the images and there is no concern for an abnormal femur length.  See note fro

## 2020-01-13 ENCOUNTER — APPOINTMENT (OUTPATIENT)
Dept: LAB | Facility: HOSPITAL | Age: 30
End: 2020-01-13
Attending: OBSTETRICS & GYNECOLOGY
Payer: MEDICAID

## 2020-01-13 ENCOUNTER — ROUTINE PRENATAL (OUTPATIENT)
Dept: OBGYN CLINIC | Facility: CLINIC | Age: 30
End: 2020-01-13
Payer: MEDICAID

## 2020-01-13 VITALS
DIASTOLIC BLOOD PRESSURE: 77 MMHG | HEART RATE: 142 BPM | SYSTOLIC BLOOD PRESSURE: 116 MMHG | WEIGHT: 195.81 LBS | BODY MASS INDEX: 33 KG/M2

## 2020-01-13 DIAGNOSIS — Z34.83 ENCOUNTER FOR SUPERVISION OF OTHER NORMAL PREGNANCY IN THIRD TRIMESTER: ICD-10-CM

## 2020-01-13 DIAGNOSIS — Z34.83 ENCOUNTER FOR SUPERVISION OF OTHER NORMAL PREGNANCY IN THIRD TRIMESTER: Primary | ICD-10-CM

## 2020-01-13 LAB
DEPRECATED RDW RBC AUTO: 46 FL (ref 35.1–46.3)
ERYTHROCYTE [DISTWIDTH] IN BLOOD BY AUTOMATED COUNT: 13.1 % (ref 11–15)
HCT VFR BLD AUTO: 40 % (ref 35–48)
HGB BLD-MCNC: 13 G/DL (ref 12–16)
MCH RBC QN AUTO: 31 PG (ref 26–34)
MCHC RBC AUTO-ENTMCNC: 32.5 G/DL (ref 31–37)
MCV RBC AUTO: 95.2 FL (ref 80–100)
MULTISTIX EXPIRATION DATE: NORMAL DATE
MULTISTIX LOT#: NORMAL NUMERIC
PH, URINE: 6.5 (ref 4.5–8)
PLATELET # BLD AUTO: 227 10(3)UL (ref 150–450)
RBC # BLD AUTO: 4.2 X10(6)UL (ref 3.8–5.3)
SPECIFIC GRAVITY: 1.01 (ref 1–1.03)
UROBILINOGEN,SEMI-QN: 0.2 MG/DL (ref 0–1.9)
WBC # BLD AUTO: 8.2 X10(3) UL (ref 4–11)

## 2020-01-13 PROCEDURE — 81002 URINALYSIS NONAUTO W/O SCOPE: CPT | Performed by: OBSTETRICS & GYNECOLOGY

## 2020-01-13 PROCEDURE — 0502F SUBSEQUENT PRENATAL CARE: CPT | Performed by: OBSTETRICS & GYNECOLOGY

## 2020-01-13 PROCEDURE — 85027 COMPLETE CBC AUTOMATED: CPT

## 2020-01-13 PROCEDURE — 36415 COLL VENOUS BLD VENIPUNCTURE: CPT

## 2020-01-17 ENCOUNTER — TELEPHONE (OUTPATIENT)
Dept: OBGYN CLINIC | Facility: CLINIC | Age: 30
End: 2020-01-17

## 2020-01-17 NOTE — TELEPHONE ENCOUNTER
Pt states she was given script by midwives for yeast infection. Pt would like to know if she can still take medication. Please advise.

## 2020-01-17 NOTE — TELEPHONE ENCOUNTER
Pt states she had a yeast infection when she was seeing the midwives. They gave her a cream.  After she finished the cream, pt had an exam and was told the yeast infection was gone.   The midwives have her a pill for yeast anyway to use if the symptoms ret

## 2020-01-21 ENCOUNTER — ROUTINE PRENATAL (OUTPATIENT)
Dept: OBGYN CLINIC | Facility: CLINIC | Age: 30
End: 2020-01-21
Payer: MEDICAID

## 2020-01-21 VITALS
SYSTOLIC BLOOD PRESSURE: 113 MMHG | WEIGHT: 194.81 LBS | DIASTOLIC BLOOD PRESSURE: 78 MMHG | BODY MASS INDEX: 32 KG/M2 | HEART RATE: 133 BPM

## 2020-01-21 DIAGNOSIS — Z34.93 ENCOUNTER FOR SUPERVISION OF NORMAL PREGNANCY IN THIRD TRIMESTER, UNSPECIFIED GRAVIDITY: Primary | ICD-10-CM

## 2020-01-21 LAB
APPEARANCE: CLEAR
MULTISTIX LOT#: NORMAL NUMERIC
PH, URINE: 6.5 (ref 4.5–8)
SPECIFIC GRAVITY: 1.01 (ref 1–1.03)
URINE-COLOR: YELLOW
UROBILINOGEN,SEMI-QN: 0.2 MG/DL (ref 0–1.9)

## 2020-01-21 PROCEDURE — 81002 URINALYSIS NONAUTO W/O SCOPE: CPT | Performed by: OBSTETRICS & GYNECOLOGY

## 2020-01-21 PROCEDURE — 0502F SUBSEQUENT PRENATAL CARE: CPT | Performed by: OBSTETRICS & GYNECOLOGY

## 2020-01-23 ENCOUNTER — HOSPITAL ENCOUNTER (OUTPATIENT)
Facility: HOSPITAL | Age: 30
Setting detail: OBSERVATION
Discharge: HOME | End: 2020-01-23
Attending: OBSTETRICS & GYNECOLOGY | Admitting: OBSTETRICS & GYNECOLOGY
Payer: MEDICAID

## 2020-01-23 ENCOUNTER — TELEPHONE (OUTPATIENT)
Dept: OBGYN CLINIC | Facility: CLINIC | Age: 30
End: 2020-01-23

## 2020-01-23 PROCEDURE — 59025 FETAL NON-STRESS TEST: CPT | Performed by: OBSTETRICS & GYNECOLOGY

## 2020-01-23 NOTE — TRIAGE
[facility] - [facility]      Triage Note    Kun Araya Patient Status:  Observation    1990 MRN [id number]   Location [address] [facility] Attending Yordy Pabon MD   Hosp Day # 0 PCP No primary care provider on fi

## 2020-01-23 NOTE — TELEPHONE ENCOUNTER
Paged, CAP, Md on Call, she stated that pt should go to Triage now. Informed pt to go to [address] [place] [address]. Informed [facility] that pt is coming to see them.

## 2020-01-23 NOTE — TELEPHONE ENCOUNTER
07I0B. Pt states that she has strong lower pressure in lower abd and her vagina, she feels like the baby's head is pushing. She states in the last hour she had 4 times it has happened and it makes her scream. She rates the pain a 8 out of 10  Pt has cramping. Denies spotting/bleeding, contractions and LOF. Pt states that the baby is moving. Last visit she was 1, 70, - 3 on 1- with VAIBHAV

## 2020-01-23 NOTE — TELEPHONE ENCOUNTER
PER PATIENT STATE SHE'S 38 WEEKS / PT STATE SHE'S HAS SOME STRONG SHARP PRESSURE IN HER VAGINAL AREA / LOW ABD /  LITTLE CRAMPING / PT WANT TO KNOW IF SHE NEED TO COME IN / PLEASE ADVISE

## 2020-01-23 NOTE — PROGRESS NOTES
Pt is a 34year old female admitted to TR3/TR3-A. Patient presents with:  Contractions: since 730 this am, No VB, LOF. +FM     Pt is  38w6d intra-uterine pregnancy. History obtained, consents signed. Oriented to room, staff, and plan of care.

## 2020-01-24 ENCOUNTER — HOSPITAL ENCOUNTER (OUTPATIENT)
Facility: HOSPITAL | Age: 30
Setting detail: OBSERVATION
Discharge: HOME | End: 2020-01-25
Attending: OBSTETRICS & GYNECOLOGY | Admitting: OBSTETRICS & GYNECOLOGY
Payer: MEDICAID

## 2020-01-25 VITALS
BODY MASS INDEX: 32.32 KG/M2 | HEIGHT: 65 IN | WEIGHT: 194 LBS | SYSTOLIC BLOOD PRESSURE: 124 MMHG | DIASTOLIC BLOOD PRESSURE: 74 MMHG | HEART RATE: 87 BPM | TEMPERATURE: 99 F

## 2020-01-25 PROCEDURE — 59025 FETAL NON-STRESS TEST: CPT | Performed by: OBSTETRICS & GYNECOLOGY

## 2020-01-25 NOTE — PROGRESS NOTES
S.K. 34YEAR OLD  39 0/7 WEEKS GESTATION    RECEIVED TO TRIAGE C/O CTX THAT WORSENED AN HOUR PRIOR TO ARRIVAL, LOF, AND BLEEDING STARTED WITH SPOTTING AND NOW MORE BRIGHT RED, STATES +FM.  ORIENTED TO THE ENVIRONMENT, POC REVIEWED, CONSENTS OBTAINED, EF

## 2020-01-25 NOTE — TRIAGE
[facility]      Triage Note    Anthony Hoffmans Patient Status:  Observation    1990 MRN [id number]   Location [address]  Attending Magdi Hughes MD   Hosp Day # 0 PCP No primary care provider on file. INCLUDING KICK COUNTS, AND RECOGNIZING LABOR. PT VERBALIZED UNDERSTANDING, ALL QUESTIONS ANSWERED. PT HOME AMBULATORY IN STABLE CONDITION, ACCOMPANIED BY SIGNIFICANT OTHER.        Leidy Aponte RN  1/25/2020 1:30 AM        Dx:  Pregnancy undelivered  I have re

## 2020-01-28 ENCOUNTER — ROUTINE PRENATAL (OUTPATIENT)
Dept: OBGYN CLINIC | Facility: CLINIC | Age: 30
End: 2020-01-28
Payer: MEDICAID

## 2020-01-28 VITALS
DIASTOLIC BLOOD PRESSURE: 76 MMHG | BODY MASS INDEX: 34 KG/M2 | HEART RATE: 103 BPM | WEIGHT: 203.38 LBS | SYSTOLIC BLOOD PRESSURE: 119 MMHG

## 2020-01-28 DIAGNOSIS — Z34.93 ENCOUNTER FOR SUPERVISION OF NORMAL PREGNANCY IN THIRD TRIMESTER, UNSPECIFIED GRAVIDITY: Primary | ICD-10-CM

## 2020-01-28 PROCEDURE — 0502F SUBSEQUENT PRENATAL CARE: CPT | Performed by: OBSTETRICS & GYNECOLOGY

## 2020-01-28 PROCEDURE — 81002 URINALYSIS NONAUTO W/O SCOPE: CPT | Performed by: OBSTETRICS & GYNECOLOGY

## 2020-01-30 ENCOUNTER — TELEPHONE (OUTPATIENT)
Dept: OBGYN CLINIC | Facility: CLINIC | Age: 30
End: 2020-01-30

## 2020-01-30 ENCOUNTER — HOSPITAL ENCOUNTER (INPATIENT)
Facility: HOSPITAL | Age: 30
LOS: 3 days | Discharge: HOME | End: 2020-02-02
Attending: OBSTETRICS & GYNECOLOGY | Admitting: OBSTETRICS & GYNECOLOGY
Payer: MEDICAID

## 2020-01-30 PROBLEM — O47.9 IRREGULAR CONTRACTIONS: Status: ACTIVE | Noted: 2020-01-30

## 2020-01-30 PROBLEM — O47.9 IRREGULAR CONTRACTIONS (HCC): Status: ACTIVE | Noted: 2020-01-30

## 2020-01-30 LAB
ANTIBODY SCREEN: NEGATIVE
DEPRECATED RDW RBC AUTO: 44.9 FL (ref 35.1–46.3)
ERYTHROCYTE [DISTWIDTH] IN BLOOD BY AUTOMATED COUNT: 13.2 % (ref 11–15)
HCT VFR BLD AUTO: 39.2 % (ref 35–48)
HGB BLD-MCNC: 13.3 G/DL (ref 12–16)
MCH RBC QN AUTO: 31.4 PG (ref 26–34)
MCHC RBC AUTO-ENTMCNC: 33.9 G/DL (ref 31–37)
MCV RBC AUTO: 92.5 FL (ref 80–100)
PLATELET # BLD AUTO: 214 10(3)UL (ref 150–450)
RBC # BLD AUTO: 4.24 X10(6)UL (ref 3.8–5.3)
RH BLOOD TYPE: POSITIVE
WBC # BLD AUTO: 7.1 X10(3) UL (ref 4–11)

## 2020-01-30 PROCEDURE — 85027 COMPLETE CBC AUTOMATED: CPT | Performed by: OBSTETRICS & GYNECOLOGY

## 2020-01-30 PROCEDURE — 86850 RBC ANTIBODY SCREEN: CPT | Performed by: OBSTETRICS & GYNECOLOGY

## 2020-01-30 PROCEDURE — 86900 BLOOD TYPING SEROLOGIC ABO: CPT | Performed by: OBSTETRICS & GYNECOLOGY

## 2020-01-30 PROCEDURE — 99214 OFFICE O/P EST MOD 30 MIN: CPT

## 2020-01-30 PROCEDURE — 86901 BLOOD TYPING SEROLOGIC RH(D): CPT | Performed by: OBSTETRICS & GYNECOLOGY

## 2020-01-30 RX ORDER — NALBUPHINE HCL 10 MG/ML
2.5 AMPUL (ML) INJECTION
Status: DISCONTINUED | OUTPATIENT
Start: 2020-01-30 | End: 2020-01-31

## 2020-01-30 RX ORDER — DEXTROSE, SODIUM CHLORIDE, SODIUM LACTATE, POTASSIUM CHLORIDE, AND CALCIUM CHLORIDE 5; .6; .31; .03; .02 G/100ML; G/100ML; G/100ML; G/100ML; G/100ML
INJECTION, SOLUTION INTRAVENOUS CONTINUOUS
Status: DISCONTINUED | OUTPATIENT
Start: 2020-01-30 | End: 2020-01-31 | Stop reason: HOSPADM

## 2020-01-30 RX ORDER — AMMONIA INHALANTS 0.04 G/.3ML
0.3 INHALANT RESPIRATORY (INHALATION) AS NEEDED
Status: DISCONTINUED | OUTPATIENT
Start: 2020-01-30 | End: 2020-01-31 | Stop reason: HOSPADM

## 2020-01-30 RX ORDER — LIDOCAINE HYDROCHLORIDE 10 MG/ML
30 INJECTION, SOLUTION EPIDURAL; INFILTRATION; INTRACAUDAL; PERINEURAL ONCE
Status: DISCONTINUED | OUTPATIENT
Start: 2020-01-30 | End: 2020-01-31 | Stop reason: HOSPADM

## 2020-01-30 RX ORDER — TRISODIUM CITRATE DIHYDRATE AND CITRIC ACID MONOHYDRATE 500; 334 MG/5ML; MG/5ML
30 SOLUTION ORAL AS NEEDED
Status: DISCONTINUED | OUTPATIENT
Start: 2020-01-30 | End: 2020-01-31 | Stop reason: HOSPADM

## 2020-01-30 RX ORDER — TERBUTALINE SULFATE 1 MG/ML
0.25 INJECTION, SOLUTION SUBCUTANEOUS AS NEEDED
Status: DISCONTINUED | OUTPATIENT
Start: 2020-01-30 | End: 2020-01-31 | Stop reason: HOSPADM

## 2020-01-30 RX ORDER — EPHEDRINE SULFATE/0.9% NACL/PF 25 MG/5 ML
5 SYRINGE (ML) INTRAVENOUS AS NEEDED
Status: DISCONTINUED | OUTPATIENT
Start: 2020-01-30 | End: 2020-01-31

## 2020-01-30 RX ORDER — SODIUM CHLORIDE, SODIUM LACTATE, POTASSIUM CHLORIDE, CALCIUM CHLORIDE 600; 310; 30; 20 MG/100ML; MG/100ML; MG/100ML; MG/100ML
INJECTION, SOLUTION INTRAVENOUS CONTINUOUS
Status: DISCONTINUED | OUTPATIENT
Start: 2020-01-30 | End: 2020-01-31 | Stop reason: HOSPADM

## 2020-01-30 RX ORDER — LIDOCAINE HYDROCHLORIDE AND EPINEPHRINE 20; 5 MG/ML; UG/ML
20 INJECTION, SOLUTION EPIDURAL; INFILTRATION; INTRACAUDAL; PERINEURAL ONCE
Status: DISCONTINUED | OUTPATIENT
Start: 2020-01-30 | End: 2020-01-31

## 2020-01-30 RX ORDER — SODIUM CHLORIDE 0.9 % (FLUSH) 0.9 %
10 SYRINGE (ML) INJECTION AS NEEDED
Status: DISCONTINUED | OUTPATIENT
Start: 2020-01-30 | End: 2020-01-31 | Stop reason: HOSPADM

## 2020-01-30 RX ORDER — IBUPROFEN 600 MG/1
600 TABLET ORAL ONCE AS NEEDED
Status: DISCONTINUED | OUTPATIENT
Start: 2020-01-30 | End: 2020-01-31 | Stop reason: HOSPADM

## 2020-01-30 RX ORDER — BUPIVACAINE HYDROCHLORIDE 2.5 MG/ML
10 INJECTION, SOLUTION EPIDURAL; INFILTRATION; INTRACAUDAL ONCE
Status: COMPLETED | OUTPATIENT
Start: 2020-01-30 | End: 2020-01-31

## 2020-01-30 NOTE — PROGRESS NOTES
Pt is a 34year old female admitted to TR3/TR3-A. Patient presents with:  R/o Labor: contractions for past 2 hours and leaking fluid     Pt is  39w6d intra-uterine pregnancy. History obtained, consents signed.  Oriented to room, staff, and plan of

## 2020-01-30 NOTE — TELEPHONE ENCOUNTER
C/O UC'S Q 2-5 MINUTES, MANY LASTING 2 MINUTES.  UC'S STARTED A COUPLE HOURS AGO. STATES SHE IS LEAKING FLUID WITH WALKING. ADVISED TO GO TO [facility]. ORAL AT [address] [facility] (ON CALL) NOTIFIED.

## 2020-01-31 ENCOUNTER — ANESTHESIA (OUTPATIENT)
Dept: OBGYN UNIT | Facility: HOSPITAL | Age: 30
End: 2020-01-31
Payer: MEDICAID

## 2020-01-31 ENCOUNTER — ANESTHESIA EVENT (OUTPATIENT)
Dept: OBGYN UNIT | Facility: HOSPITAL | Age: 30
End: 2020-01-31
Payer: MEDICAID

## 2020-01-31 PROBLEM — Z98.891 PREVIOUS CESAREAN SECTION: Status: ACTIVE | Noted: 2020-01-31

## 2020-01-31 PROCEDURE — 87040 BLOOD CULTURE FOR BACTERIA: CPT | Performed by: OBSTETRICS & GYNECOLOGY

## 2020-01-31 RX ORDER — ONDANSETRON 2 MG/ML
4 INJECTION INTRAMUSCULAR; INTRAVENOUS ONCE AS NEEDED
Status: ACTIVE | OUTPATIENT
Start: 2020-01-31 | End: 2020-01-31

## 2020-01-31 RX ORDER — SODIUM CHLORIDE, SODIUM LACTATE, POTASSIUM CHLORIDE, CALCIUM CHLORIDE 600; 310; 30; 20 MG/100ML; MG/100ML; MG/100ML; MG/100ML
INJECTION, SOLUTION INTRAVENOUS CONTINUOUS
Status: DISCONTINUED | OUTPATIENT
Start: 2020-01-31 | End: 2020-01-31

## 2020-01-31 RX ORDER — IBUPROFEN 600 MG/1
600 TABLET ORAL EVERY 6 HOURS PRN
Status: DISCONTINUED | OUTPATIENT
Start: 2020-01-31 | End: 2020-02-02

## 2020-01-31 RX ORDER — METOCLOPRAMIDE 10 MG/1
10 TABLET ORAL ONCE
Status: DISCONTINUED | OUTPATIENT
Start: 2020-01-31 | End: 2020-01-31

## 2020-01-31 RX ORDER — AMMONIA INHALANTS 0.04 G/.3ML
0.3 INHALANT RESPIRATORY (INHALATION) AS NEEDED
Status: DISCONTINUED | OUTPATIENT
Start: 2020-01-31 | End: 2020-02-02

## 2020-01-31 RX ORDER — SIMETHICONE 80 MG
80 TABLET,CHEWABLE ORAL 3 TIMES DAILY PRN
Status: DISCONTINUED | OUTPATIENT
Start: 2020-01-31 | End: 2020-02-02

## 2020-01-31 RX ORDER — DOCUSATE SODIUM 100 MG/1
100 CAPSULE, LIQUID FILLED ORAL DAILY
Status: DISCONTINUED | OUTPATIENT
Start: 2020-01-31 | End: 2020-02-02

## 2020-01-31 RX ORDER — CHOLECALCIFEROL (VITAMIN D3) 25 MCG
1 TABLET,CHEWABLE ORAL DAILY
Status: DISCONTINUED | OUTPATIENT
Start: 2020-01-31 | End: 2020-02-02

## 2020-01-31 RX ORDER — LIDOCAINE HYDROCHLORIDE AND EPINEPHRINE 15; 5 MG/ML; UG/ML
INJECTION, SOLUTION EPIDURAL
Status: COMPLETED | OUTPATIENT
Start: 2020-01-31 | End: 2020-01-31

## 2020-01-31 RX ORDER — DIAPER,BRIEF,INFANT-TODD,DISP
1 EACH MISCELLANEOUS EVERY 6 HOURS PRN
Status: DISCONTINUED | OUTPATIENT
Start: 2020-01-31 | End: 2020-02-02

## 2020-01-31 RX ORDER — SODIUM CHLORIDE 0.9 % (FLUSH) 0.9 %
10 SYRINGE (ML) INJECTION AS NEEDED
Status: DISCONTINUED | OUTPATIENT
Start: 2020-01-31 | End: 2020-02-02

## 2020-01-31 RX ORDER — FAMOTIDINE 10 MG/ML
20 INJECTION, SOLUTION INTRAVENOUS ONCE
Status: DISCONTINUED | OUTPATIENT
Start: 2020-01-31 | End: 2020-01-31 | Stop reason: HOSPADM

## 2020-01-31 RX ORDER — METOCLOPRAMIDE HYDROCHLORIDE 5 MG/ML
10 INJECTION INTRAMUSCULAR; INTRAVENOUS ONCE
Status: DISCONTINUED | OUTPATIENT
Start: 2020-01-31 | End: 2020-01-31

## 2020-01-31 RX ORDER — ACETAMINOPHEN 500 MG
1000 TABLET ORAL ONCE
Status: COMPLETED | OUTPATIENT
Start: 2020-01-31 | End: 2020-01-31

## 2020-01-31 RX ORDER — SODIUM CHLORIDE 0.9 % (FLUSH) 0.9 %
10 SYRINGE (ML) INJECTION AS NEEDED
Status: DISCONTINUED | OUTPATIENT
Start: 2020-01-31 | End: 2020-01-31

## 2020-01-31 RX ORDER — LIDOCAINE HYDROCHLORIDE 10 MG/ML
INJECTION, SOLUTION EPIDURAL; INFILTRATION; INTRACAUDAL; PERINEURAL AS NEEDED
Status: DISCONTINUED | OUTPATIENT
Start: 2020-01-31 | End: 2020-01-31 | Stop reason: SURG

## 2020-01-31 RX ORDER — TRISODIUM CITRATE DIHYDRATE AND CITRIC ACID MONOHYDRATE 500; 334 MG/5ML; MG/5ML
30 SOLUTION ORAL ONCE
Status: DISCONTINUED | OUTPATIENT
Start: 2020-01-31 | End: 2020-01-31

## 2020-01-31 RX ORDER — FAMOTIDINE 20 MG/1
20 TABLET ORAL ONCE
Status: DISCONTINUED | OUTPATIENT
Start: 2020-01-31 | End: 2020-01-31

## 2020-01-31 RX ADMIN — LIDOCAINE HYDROCHLORIDE 3 ML: 10 INJECTION, SOLUTION EPIDURAL; INFILTRATION; INTRACAUDAL; PERINEURAL at 03:29:00

## 2020-01-31 RX ADMIN — BUPIVACAINE HYDROCHLORIDE 5 ML: 2.5 INJECTION, SOLUTION EPIDURAL; INFILTRATION; INTRACAUDAL at 11:46:00

## 2020-01-31 RX ADMIN — LIDOCAINE HYDROCHLORIDE AND EPINEPHRINE 5 ML: 15; 5 INJECTION, SOLUTION EPIDURAL at 03:34:00

## 2020-01-31 RX ADMIN — BUPIVACAINE HYDROCHLORIDE 3 ML: 2.5 INJECTION, SOLUTION EPIDURAL; INFILTRATION; INTRACAUDAL at 03:38:00

## 2020-01-31 NOTE — ANESTHESIA POSTPROCEDURE EVALUATION
Patient: Aakash Easton    Procedure Summary     Date:  01/31/20 Room / Location:      Anesthesia Start:  0329 Anesthesia Stop:  0971    Procedure:  LABOR ANALGESIA Diagnosis:      Scheduled Providers:   Anesthesiologist:  Kody Armenta MD

## 2020-01-31 NOTE — ANESTHESIA PROCEDURE NOTES
Labor Analgesia  Date/Time: 1/31/2020 3:34 AM  Performed by: Danielle Brar MD  Authorized by: Danielle Brar MD       General Information and Staff    Start Time:  1/31/2020 3:29 AM  Anesthesiologist:  Danielle Brar MD  Performed by:   Anesthesiolog

## 2020-01-31 NOTE — DISCHARGE SUMMARY
[facility]    Discharge Summary    Hungaquiles Hunt Patient Status:  Inpatient    1990 MRN [id number]   Location [address]  Attending Laura Diaz MD   Hosp Day # 1       Admit date:  2020

## 2020-01-31 NOTE — PLAN OF CARE
Received patient into room 351. Bedside shift report received from Mikki. .  Side rails up X2. Vital signs unstable, called MD for orders, fundus firm at U/U, lochia small, no clots noted. IV right site unremarkable. Pain level 0/10.   Baby present in op

## 2020-01-31 NOTE — PROGRESS NOTES
[facility]    Vaginal Delivery Note    Priscella Gottron Patient Status:  Inpatient    1990 MRN [id number]   Location [address]  Attending Sada Dallas MD   Hosp Day # 1 PCP No primary care provider

## 2020-01-31 NOTE — H&P
Gerber Hunt Patient Status:  Inpatient    1990 MRN [id number]   Location [address] [facility] Attending Beatriz Davies MD   Hosp Day # 1 PCP No primary care provider on documented in HPI      Physical Exam:   Temp:  [98 °F (36.7 °C)-98.8 °F (37.1 °C)] 98.4 °F (36.9 °C)  Pulse:  [74-96] 82  Resp:  [16] 16  BP: ()/(41-97) 104/41    Constitutional: alert and cooperative in No distress since epidural  Psych:  Alert and

## 2020-01-31 NOTE — PROGRESS NOTES
Admitted this 35 y/o  at 41 11/7 wks gestation for SROM at 1400 today and contractions. Dr. Sofia Russell called and orders received. Patient oriented to unit and surroundings, informed of plan of care and agrees.

## 2020-01-31 NOTE — PROGRESS NOTES
[facility] Dosing Service    Initial Pharmacokinetic Consult for Once Daily Aminoglycoside Dosing      Zoë Morales is a 34year old female who is being treated for fever of unknown origin.   Pharmacy has been asked to dose gentamicin by Dr. Orquidea Wood opportunity to assist in her care.     Tricia Perry [facility]  1/31/2020  5:22 PM

## 2020-01-31 NOTE — PAYOR COMM NOTE
--------------  ADMISSION REVIEW     Payor: Efra Bettencourt #:  [id number]  Authorization Number: [id number]    Admit date: 1/30/20  Admit time: 26         History & Physical    Date of Admission:  1/30/2020      HPI asynclitic      Musculoskeletal:  No calf tenderness    FHT assessment:   Baseline: 150 bpm   Variability: moderate   Accels:  present   Decels: Yes, 3-4 min decel earlier this am due to  tachysystole- responded to repositioning,O2 and terb   Tocos:  contr 16 donte-units/min Intravenous     1/30/2020 2315 Rate/Dose Change 14 donte-units/min Intravenous     1/30/2020 2300 Rate/Dose Change 12 donte-units/min Intravenous     1/30/2020 2230 Rate/Dose Change 10 donte-units/min Intravenous     1/30/2020 2200 Rate/

## 2020-01-31 NOTE — ANESTHESIA PREPROCEDURE EVALUATION
Anesthesia PreOp Note    HPI:     Whitley Gibbons is a 34year old female who presents for preoperative consultation requested by: * No surgeons listed *    Date of Surgery: 1/31/2020    * No procedures listed *  Indication: * No pre-op diagnosis ente 30 units/ 500 ml 0.9% NS premix infusion, 0.5-20 donte-units/min, Intravenous, Continuous, Francisco Abreu MD, Last Rate: 14 mL/hr at 01/31/20 0100, 14 donte-units/min at 01/31/20 0100  lactated ringers IV bolus 1,000 mL, 1,000 mL, Intravenous, Once, Abraham Lee Not Currently        Binge frequency: Never        Comment: Socially, prior to pregnancy      Drug use: Not Currently        Frequency: 7.0 times per week        Types: Cannabis        Comment: Stopped when she found out she was pregnant      Sexual Migue Pablo negative ROS and normal exam    breath sounds clear to auscultation  Cardiovascular - negative ROS and normal exam    Rhythm: regular  Rate: normal    Neuro/Psych - negative ROS     GI/Hepatic/Renal - negative ROS     Endo/Other - negative ROS   Abdominal

## 2020-02-01 LAB
ANION GAP SERPL CALC-SCNC: 5 MMOL/L (ref 0–18)
BASOPHILS # BLD: 0 X10(3) UL (ref 0–0.2)
BASOPHILS NFR BLD: 0 %
BUN BLD-MCNC: 5 MG/DL (ref 7–18)
BUN/CREAT SERPL: 7.7 (ref 10–20)
CALCIUM BLD-MCNC: 9.2 MG/DL (ref 8.5–10.1)
CHLORIDE SERPL-SCNC: 110 MMOL/L (ref 98–112)
CO2 SERPL-SCNC: 25 MMOL/L (ref 21–32)
CREAT BLD-MCNC: 0.65 MG/DL (ref 0.55–1.02)
DEPRECATED RDW RBC AUTO: 45.9 FL (ref 35.1–46.3)
EOSINOPHIL # BLD: 0 X10(3) UL (ref 0–0.7)
EOSINOPHIL NFR BLD: 0 %
ERYTHROCYTE [DISTWIDTH] IN BLOOD BY AUTOMATED COUNT: 13.2 % (ref 11–15)
GLUCOSE BLD-MCNC: 64 MG/DL (ref 70–99)
HCT VFR BLD AUTO: 33.8 % (ref 35–48)
HGB BLD-MCNC: 11.3 G/DL (ref 12–16)
LYMPHOCYTES NFR BLD: 0.8 X10(3) UL (ref 1–4)
LYMPHOCYTES NFR BLD: 6 %
MCH RBC QN AUTO: 31.7 PG (ref 26–34)
MCHC RBC AUTO-ENTMCNC: 33.4 G/DL (ref 31–37)
MCV RBC AUTO: 94.9 FL (ref 80–100)
MONOCYTES # BLD: 0.8 X10(3) UL (ref 0.1–1)
MONOCYTES NFR BLD: 6 %
MORPHOLOGY: NORMAL
NEUTROPHILS # BLD AUTO: 10.48 X10 (3) UL (ref 1.5–7.7)
NEUTROPHILS NFR BLD: 59 %
NEUTS BAND NFR BLD: 29 %
NEUTS HYPERSEG # BLD: 11.7 X10(3) UL (ref 1.5–7.7)
OSMOLALITY SERPL CALC.SUM OF ELEC: 285 MOSM/KG (ref 275–295)
PLATELET # BLD AUTO: 136 10(3)UL (ref 150–450)
PLATELET MORPHOLOGY: NORMAL
POTASSIUM SERPL-SCNC: 4.2 MMOL/L (ref 3.5–5.1)
RBC # BLD AUTO: 3.56 X10(6)UL (ref 3.8–5.3)
SODIUM SERPL-SCNC: 140 MMOL/L (ref 136–145)
TOTAL CELLS COUNTED: 100
WBC # BLD AUTO: 13.3 X10(3) UL (ref 4–11)

## 2020-02-01 PROCEDURE — 80048 BASIC METABOLIC PNL TOTAL CA: CPT | Performed by: OBSTETRICS & GYNECOLOGY

## 2020-02-01 PROCEDURE — 85025 COMPLETE CBC W/AUTO DIFF WBC: CPT | Performed by: OBSTETRICS & GYNECOLOGY

## 2020-02-01 PROCEDURE — 85027 COMPLETE CBC AUTOMATED: CPT | Performed by: OBSTETRICS & GYNECOLOGY

## 2020-02-01 PROCEDURE — 85007 BL SMEAR W/DIFF WBC COUNT: CPT | Performed by: OBSTETRICS & GYNECOLOGY

## 2020-02-01 NOTE — PLAN OF CARE
Pt up to BR with assistance, pt complains of bilateral groin pain when she walks, denies numbness or tingling. Dr Carvajal Last aware. Pt using ice packs, tucks and dermaplast spray, motrin for pain. Pt breast feeding well.  Questions answered, time spent with pt, service/psych resources as needed. - Utilize standard precautions and use personal protective equipment as indicated.  Ensure aseptic care of all intravenous lines and invasive tubes/drains.  - Obtain immunization and exposure to communicable diseases hist expression (breast pumping). - Provide pumping equipment/supplies, instructions, and assistance until it is safe to breastfeed infant.   Outcome: Progressing  Goal: Appropriate maternal -  bonding  Description  INTERVENTIONS:  - Assess caregiver-ne

## 2020-02-01 NOTE — PLAN OF CARE
Problem: PAIN - ADULT  Goal: Verbalizes/displays adequate comfort level or patient's stated pain goal  Description  INTERVENTIONS:  - Encourage pt to monitor pain and request assistance  - Assess pain using appropriate pain scale  - Administer analgesics Progressing  Goal: Optimize infant feeding at the breast  Description  INTERVENTIONS:  - Initiate breast feeding within first hour after birth. - Monitor effectiveness of current breast feeding efforts.   - Assess support systems available to mother/famil interactions. - Assess caregiver's emotional status and coping mechanisms. - Encourage caregiver to participate in  daily care. - Assess support systems available to mother/family.  - Provide /case management support as needed.   Brianna Lemus

## 2020-02-01 NOTE — PROGRESS NOTES
Post-Partum Note   2/1/2020, 10:24 AM    Subjective:  Patient doing well. She is ambulating without lightheadedness or dizziness. Although she is having some difficulties walking. She states she has pain and weakness in her groin area.  Denies fevers or chi

## 2020-02-01 NOTE — PLAN OF CARE
notified Dr Archie Rawls of abnormal Vitals signs, Temp BP and HR, orders for antibiotics and blood culturs

## 2020-02-01 NOTE — PROGRESS NOTES
Patient/Lennie with , cousin at bedside. Cinthya Kenrick said she feels good but exhausted. Cinthya Choudhary breastfeeding yet desired a baby blessing at this time. CP inquired about prayer, prayed for patient as requested and blessed baby.    No other SC needs a

## 2020-02-02 VITALS
WEIGHT: 203 LBS | TEMPERATURE: 98 F | SYSTOLIC BLOOD PRESSURE: 118 MMHG | BODY MASS INDEX: 34 KG/M2 | HEART RATE: 71 BPM | OXYGEN SATURATION: 99 % | RESPIRATION RATE: 16 BRPM | DIASTOLIC BLOOD PRESSURE: 59 MMHG

## 2020-02-02 RX ORDER — HYDROCODONE BITARTRATE AND ACETAMINOPHEN 5; 325 MG/1; MG/1
1 TABLET ORAL EVERY 6 HOURS PRN
Status: DISCONTINUED | OUTPATIENT
Start: 2020-02-02 | End: 2020-02-02

## 2020-02-02 RX ORDER — HYDROCODONE BITARTRATE AND ACETAMINOPHEN 5; 325 MG/1; MG/1
1 TABLET ORAL EVERY 6 HOURS PRN
Qty: 10 TABLET | Refills: 0 | Status: SHIPPED | OUTPATIENT
Start: 2020-02-02 | End: 2020-03-16

## 2020-02-02 RX ORDER — IBUPROFEN 600 MG/1
600 TABLET ORAL EVERY 6 HOURS PRN
Qty: 30 TABLET | Refills: 0 | Status: SHIPPED | OUTPATIENT
Start: 2020-02-02 | End: 2020-03-16

## 2020-02-02 NOTE — PLAN OF CARE
Problem: PAIN - ADULT  Goal: Verbalizes/displays adequate comfort level or patient's stated pain goal  Description  INTERVENTIONS:  - Encourage pt to monitor pain and request assistance  - Assess pain using appropriate pain scale  - Administer analgesics Progressing  Goal: Optimize infant feeding at the breast  Description  INTERVENTIONS:  - Initiate breast feeding within first hour after birth. - Monitor effectiveness of current breast feeding efforts.   - Assess support systems available to mother/famil interactions. - Assess caregiver's emotional status and coping mechanisms. - Encourage caregiver to participate in  daily care. - Assess support systems available to mother/family.  - Provide /case management support as needed.   Shakir Beard

## 2020-02-02 NOTE — PLAN OF CARE
Spoke to Dr Orly Martinez about BMP result glucose this afternoon at 1810 of 64. Pt is not diabetic, asymptomatic, pt at meal prior to result in Epic .  Orders not to re-heck or treat with any po glucose since pt ate meal. If pt becomes symptomatic may do an accuhec

## 2020-02-02 NOTE — LACTATION NOTE
LACTATION NOTE - MOTHER      Evaluation Type: Inpatient    Problems identified  Problems identified: Knowledge deficit;Milk supply not WNL  Milk supply not WNL: (not pumping when formula given and within 2 hours)    Maternal history  Other/comment: history during the first two weeks postpartum. Informed of AAP guidelines for safe pacifier use when breastfeeding and safe sleep guidelines. Assisted with positioning and infant obtained a deep latch in the left cross cradle laid back position.  Encouraged cue base

## 2020-02-02 NOTE — PLAN OF CARE
Problem: PAIN - ADULT  Goal: Verbalizes/displays adequate comfort level or patient's stated pain goal  Description  INTERVENTIONS:  - Encourage pt to monitor pain and request assistance  - Assess pain using appropriate pain scale  - Administer analgesics Progressing  Goal: Optimize infant feeding at the breast  Description  INTERVENTIONS:  - Initiate breast feeding within first hour after birth. - Monitor effectiveness of current breast feeding efforts.   - Assess support systems available to mother/famil interactions. - Assess caregiver's emotional status and coping mechanisms. - Encourage caregiver to participate in  daily care. - Assess support systems available to mother/family.  - Provide /case management support as needed.   Richar Eubanks

## 2020-02-02 NOTE — PLAN OF CARE
Problem: PAIN - ADULT  Goal: Verbalizes/displays adequate comfort level or patient's stated pain goal  Description  INTERVENTIONS:  - Encourage pt to monitor pain and request assistance  - Assess pain using appropriate pain scale  - Administer analgesics protective equipment as indicated. Ensure aseptic care of all intravenous lines and invasive tubes/drains.  - Obtain immunization and exposure to communicable diseases history.   2/2/2020 1326 by Abigail Wolfe RN  Outcome: Completed  2/2/2020 1209 by Slade Bennett supply with medication/procedure interruptions  Description  INTERVENTIONS:  - Review techniques for milk expression (breast pumping). - Provide pumping equipment/supplies, instructions, and assistance until it is safe to breastfeed infant.   2/2/2020 132

## 2020-02-03 NOTE — PAYOR COMM NOTE
REF: [id number]      20  Vaginal Delivery Note           Jodee Keating Patient Status:  Inpatient    1990 MRN [id number]   Location [address]  Attending Lois, Francisco Browne MD   Hosp Day # 1 PCP No primary care p

## 2020-02-04 ENCOUNTER — TELEPHONE (OUTPATIENT)
Dept: OBGYN UNIT | Facility: HOSPITAL | Age: 30
End: 2020-02-04

## 2020-02-04 NOTE — PROGRESS NOTES
Reviewed self and infant care with mom, she verbalizes understanding of instruction reviewed. Encouraged to follow up with MD's as directed with questions/concerns. EPDS 0 patient denies postpartum depression or baby blues. Care reviewed.

## 2020-02-06 ENCOUNTER — TELEPHONE (OUTPATIENT)
Dept: OBGYN CLINIC | Facility: CLINIC | Age: 30
End: 2020-02-06

## 2020-02-06 NOTE — TELEPHONE ENCOUNTER
Pt is 6 days PP. Had  on 20 with MIRACLE. States she is having pain inside the vagina and sometimes she cannot walk because the \"pain is so bad\". Denies vaginal tears during labor. Reports red lochia and changing a pad about 3-4 times per day.  Ariana Carrasco

## 2020-03-16 ENCOUNTER — POSTPARTUM (OUTPATIENT)
Dept: OBGYN CLINIC | Facility: CLINIC | Age: 30
End: 2020-03-16
Payer: MEDICAID

## 2020-03-16 VITALS
DIASTOLIC BLOOD PRESSURE: 75 MMHG | SYSTOLIC BLOOD PRESSURE: 118 MMHG | WEIGHT: 171 LBS | BODY MASS INDEX: 28 KG/M2 | HEART RATE: 91 BPM

## 2020-03-16 DIAGNOSIS — Z30.430 ENCOUNTER FOR INSERTION OF INTRAUTERINE CONTRACEPTIVE DEVICE: Primary | ICD-10-CM

## 2020-03-16 DIAGNOSIS — Z11.3 SCREENING EXAMINATION FOR STD (SEXUALLY TRANSMITTED DISEASE): ICD-10-CM

## 2020-03-16 PROBLEM — A59.9 TRICHOMONAS INFECTION: Status: RESOLVED | Noted: 2019-07-10 | Resolved: 2020-03-16

## 2020-03-16 PROBLEM — Z87.898 HISTORY OF SUBSTANCE USE: Status: RESOLVED | Noted: 2019-08-28 | Resolved: 2020-03-16

## 2020-03-16 PROBLEM — O47.9 IRREGULAR CONTRACTIONS: Status: RESOLVED | Noted: 2020-01-30 | Resolved: 2020-03-16

## 2020-03-16 PROBLEM — O35.HXX0 SHORT FEMUR OF FETUS ON PRENATAL ULTRASOUND: Status: RESOLVED | Noted: 2020-01-08 | Resolved: 2020-03-16

## 2020-03-16 PROBLEM — O35.HXX0 SHORT FEMUR OF FETUS ON PRENATAL ULTRASOUND (HCC): Status: RESOLVED | Noted: 2020-01-08 | Resolved: 2020-03-16

## 2020-03-16 PROBLEM — Z34.90 PREGNANCY (HCC): Status: RESOLVED | Noted: 2019-12-26 | Resolved: 2020-03-16

## 2020-03-16 PROBLEM — O47.9 IRREGULAR CONTRACTIONS (HCC): Status: RESOLVED | Noted: 2020-01-30 | Resolved: 2020-03-16

## 2020-03-16 PROBLEM — O35.8XX0 SHORT FEMUR OF FETUS ON PRENATAL ULTRASOUND: Status: RESOLVED | Noted: 2020-01-08 | Resolved: 2020-03-16

## 2020-03-16 PROBLEM — Z34.90 PREGNANCY: Status: RESOLVED | Noted: 2019-12-26 | Resolved: 2020-03-16

## 2020-03-16 PROCEDURE — 58300 INSERT INTRAUTERINE DEVICE: CPT | Performed by: OBSTETRICS & GYNECOLOGY

## 2020-03-16 PROCEDURE — 0503F POSTPARTUM CARE VISIT: CPT | Performed by: OBSTETRICS & GYNECOLOGY

## 2020-03-16 NOTE — PROGRESS NOTES
IUD Insertion     Pregnancy Results: n/a   Birth control method(s) used:  abstinence  Consent signed. Procedure discussed with the patient in detail including indication, risks, benefits, alternatives and complications.     Pelvic Exam Findings:  EG, vagin

## 2020-03-16 NOTE — PROGRESS NOTES
[place]    Whitley Gibbons is a 34year old female [id number] here for 6 week post-partum visit. Patient delivered a  female infant on 01-31-20. Patient desires IUD - Mirena. for contraception. Patient is bottle feeding since 2 weeks.    Patient denies Willetta Alert orthoevra patch, nexplanon, Depoprovera, condoms or tubal sterilization options. Patient has chosen IUD - Mirena. She is menstruating now and we'll place Mirena today. Patient to return for IUD check in 3 months and annual gyne exam in January.

## 2020-03-17 LAB
C TRACH DNA SPEC QL NAA+PROBE: NEGATIVE
N GONORRHOEA DNA SPEC QL NAA+PROBE: NEGATIVE

## 2020-03-18 LAB — T VAGINALIS RRNA SPEC QL NAA+PROBE: NEGATIVE

## 2020-06-13 ENCOUNTER — TELEPHONE (OUTPATIENT)
Dept: OBGYN CLINIC | Facility: CLINIC | Age: 30
End: 2020-06-13

## 2020-06-13 NOTE — TELEPHONE ENCOUNTER
Pt reports vaginal discharge and vaginal irritation x 2 days. Pt denies vaginal odor. Pt requesting to speak with MIRACLE regarding BTL. Pt had IUD placed 3/2020. Pt reports partner feels IUD during [address] [place] [address] [address]  Pt does not feel for strings monthly and pt states she ne

## 2020-06-19 ENCOUNTER — OFFICE VISIT (OUTPATIENT)
Dept: OBGYN CLINIC | Facility: CLINIC | Age: 30
End: 2020-06-19
Payer: MEDICAID

## 2020-06-19 VITALS
BODY MASS INDEX: 29 KG/M2 | SYSTOLIC BLOOD PRESSURE: 117 MMHG | HEART RATE: 74 BPM | DIASTOLIC BLOOD PRESSURE: 80 MMHG | WEIGHT: 174.19 LBS

## 2020-06-19 DIAGNOSIS — Z30.431 IUD CHECK UP: Primary | ICD-10-CM

## 2020-06-19 PROCEDURE — 99213 OFFICE O/P EST LOW 20 MIN: CPT | Performed by: OBSTETRICS & GYNECOLOGY

## 2020-06-19 RX ORDER — FLUCONAZOLE 150 MG/1
150 TABLET ORAL ONCE
Qty: 2 TABLET | Refills: 0 | Status: SHIPPED | OUTPATIENT
Start: 2020-06-19 | End: 2020-06-19

## 2020-06-26 NOTE — PROGRESS NOTES
HPI:    Patient ID: Vimal Chandra is a 34year old female. HPI   S/P Mirena IUD insertion on 20. She did c/o 2 week duration menses x 2 but last was 7 days. She also related deep thrust dyspareunia which has now resolved.      Review Imaging & Referrals:  None       UL#[id number]

## 2020-07-07 ENCOUNTER — TELEPHONE (OUTPATIENT)
Dept: OBGYN CLINIC | Facility: CLINIC | Age: 30
End: 2020-07-07

## 2020-07-07 NOTE — TELEPHONE ENCOUNTER
Pt had the mirena placed on 3/16/2020. Pt states she does not like the way the IUD makes her feel. States the bleeding is not getting lighter, it is getting heavier.   She also said the bleeding is irregular and lasting longer than a normal period, someti

## 2020-07-11 NOTE — TELEPHONE ENCOUNTER
Pt made an appt with MIRACLE.  Pt given the date, time and location for the appt. Pt wants to discuss possibly getting her IUD removed at the appt.

## 2020-07-11 NOTE — TELEPHONE ENCOUNTER
Pt canceled appt today states wasn't able to come in, MIRACLE has no openings soon and pt wondering if she can be squeezed in or see another provider as her IUD is giving her issues.  Please advise

## 2020-07-16 ENCOUNTER — TELEPHONE (OUTPATIENT)
Dept: OBGYN CLINIC | Facility: CLINIC | Age: 30
End: 2020-07-16

## 2020-07-16 NOTE — TELEPHONE ENCOUNTER
Pt stated she had to cancel her appt today with MIRACLE because she doesn't have childcare. Pt stated her fiance was supposed to watch child but ended up having to go into work. Pt asking if she can see MIRACLE Saturday. Pt informed that MIRACLE is not here on Saturday but has an opening Monday morning. Pt stated she will check with her fiance to see what his schedule is like and call back to schedule. OK to use RES 24 slot when pt calls back. Please assist pt with scheduling,. Thanks.

## 2020-07-21 ENCOUNTER — TELEPHONE (OUTPATIENT)
Dept: OBGYN CLINIC | Facility: CLINIC | Age: 30
End: 2020-07-21

## 2020-07-21 NOTE — TELEPHONE ENCOUNTER
Pt states she has decided to not have IUD removed. Assisted pt with scheduling appt for annual exam for 12/2/2020 at 540pm with MIRACLE at [facility]. Pt verbalized understanding.

## 2020-07-28 ENCOUNTER — TELEPHONE (OUTPATIENT)
Dept: OBGYN CLINIC | Facility: CLINIC | Age: 30
End: 2020-07-28

## 2020-07-28 NOTE — TELEPHONE ENCOUNTER
Pt states that she would like the IUD removed. Pt is interested in birth control pills. Pt scheduled an appt with MIRACLE. Pt given the date, time and location for the appt.

## 2020-08-06 ENCOUNTER — OFFICE VISIT (OUTPATIENT)
Dept: OBGYN CLINIC | Facility: CLINIC | Age: 30
End: 2020-08-06
Payer: MEDICAID

## 2020-08-06 VITALS
WEIGHT: 172 LBS | HEART RATE: 76 BPM | DIASTOLIC BLOOD PRESSURE: 74 MMHG | BODY MASS INDEX: 29 KG/M2 | SYSTOLIC BLOOD PRESSURE: 116 MMHG

## 2020-08-06 DIAGNOSIS — Z30.432 ENCOUNTER FOR REMOVAL OF INTRAUTERINE CONTRACEPTIVE DEVICE: Primary | ICD-10-CM

## 2020-08-06 DIAGNOSIS — Z30.09 ENCOUNTER FOR GENERAL COUNSELING AND ADVICE ON CONTRACEPTIVE MANAGEMENT: ICD-10-CM

## 2020-08-06 PROCEDURE — 3074F SYST BP LT 130 MM HG: CPT | Performed by: OBSTETRICS & GYNECOLOGY

## 2020-08-06 PROCEDURE — 58301 REMOVE INTRAUTERINE DEVICE: CPT | Performed by: OBSTETRICS & GYNECOLOGY

## 2020-08-06 PROCEDURE — 3078F DIAST BP <80 MM HG: CPT | Performed by: OBSTETRICS & GYNECOLOGY

## 2020-08-06 RX ORDER — NORGESTIMATE AND ETHINYL ESTRADIOL 0.25-0.035
1 KIT ORAL DAILY
Qty: 1 PACKAGE | Refills: 9 | Status: SHIPPED | OUTPATIENT
Start: 2020-08-06 | End: 2021-03-03

## 2020-08-11 NOTE — PROGRESS NOTES
IUD Removal     Pregnancy Results: n/a  Birth control method(s) used: Mirena  Consent signed. Procedure discussed with the patient in detail including indication, risks, benefits, alternatives and complications.     Pelvic Exam Findings:  EG, vagina and ce

## 2020-12-22 ENCOUNTER — TELEPHONE (OUTPATIENT)
Dept: OBGYN CLINIC | Facility: CLINIC | Age: 30
End: 2020-12-22

## 2020-12-22 NOTE — TELEPHONE ENCOUNTER
C/o thick white vaginal discharge since Sunday. States that is why the Verneda Francesco and shaving were so painful because the area was already irritated from the yeast infection. Pt states OTC meds don't work for her and would like diflucan. Pharmacy updated.   NK

## 2020-12-22 NOTE — TELEPHONE ENCOUNTER
Patient has had itching and irritation in the vaginal area since Sunday 12/20. She is hoping a prescription can be ordered prior to her appointment on 12/24 with Dr Tari Osorio. Please advise.

## 2020-12-22 NOTE — TELEPHONE ENCOUNTER
Pt stated that she used Cecilio Pintos in the vaginal area and then shaved area on Saturday night. Pt stated that since Sunday she has been experiencing external vaginal itching and irritation.  Pt offered an appt with ES today but declined because she has no one to

## 2020-12-23 NOTE — TELEPHONE ENCOUNTER
I spoke with Stryker Pulling in evening of 12-22 and discussed symptoms. I did not have the chart open. I called in Diflucan 150 mg x 2 doses 3 days apart to [facility] in [facility].

## 2021-01-12 ENCOUNTER — TELEPHONE (OUTPATIENT)
Dept: OBGYN CLINIC | Facility: CLINIC | Age: 31
End: 2021-01-12

## 2021-01-12 ENCOUNTER — PATIENT MESSAGE (OUTPATIENT)
Dept: OBGYN CLINIC | Facility: CLINIC | Age: 31
End: 2021-01-12

## 2021-01-12 RX ORDER — FLUCONAZOLE 150 MG/1
150 TABLET ORAL ONCE
Qty: 1 TABLET | Refills: 0 | Status: SHIPPED | OUTPATIENT
Start: 2021-01-12 | End: 2021-01-12

## 2021-01-12 NOTE — TELEPHONE ENCOUNTER
Pt reports creamy vaginal discharge and is requesting diflucan. Spoke with MIRACLE who stated OK for Diflucan x 1 tab. Pt instructed to call office if symptoms do not resolve or if develops vaginal odor. Pt verbalized understanding. Pt placed me on hold to let me know which pharmacy she needs med to go to and then call was disconnected. LMTCB.

## 2021-01-12 NOTE — TELEPHONE ENCOUNTER
Patient called back asking for a call back and wanting to know if she can get the medication for the yeast infection as soon as possible.

## 2021-01-12 NOTE — TELEPHONE ENCOUNTER
Patient called back giving the pharmacy address and number to [facility] prescription.     [address]  [place], [address] [facility]    Phone# [phone]

## 2021-01-12 NOTE — TELEPHONE ENCOUNTER
From: Susy Hagan  To: Mily Vasquez. DO [place]  Sent: 1/12/2021 11:15 AM CST  Subject: Prescription Question    Hi Dr. Amy Miranda,     Can send another prescription of the pill to my pharmacy for my yeast infection.  I made a mistake last time and drunk

## 2021-02-08 ENCOUNTER — TELEPHONE (OUTPATIENT)
Dept: OBGYN CLINIC | Facility: CLINIC | Age: 31
End: 2021-02-08

## 2021-02-08 NOTE — TELEPHONE ENCOUNTER
Pt currently on OCPs and has been experiencing irregular bleeding. Pt stated she skipped her placebo pills in December so she didn't get a period and then had a period x 2 in January.  Pt stated that in January, when she was supposed to start her placebo pi

## 2021-02-08 NOTE — TELEPHONE ENCOUNTER
Patient would like to speak with someone. She did not get her period in December. In mid-January, she got it and it was very heavy. A few days later she got it again and it has been intermittent.   She does have an annual exam coming up but would like to

## 2021-02-09 NOTE — TELEPHONE ENCOUNTER
Please call pt to inform her that we need to cancel her appt today with MIRACLE and he will see her on Saturday at 0830 if she is available.

## 2021-02-09 NOTE — TELEPHONE ENCOUNTER
Pt's appt was cancel today. Pt will take the appt that was offered to her on Saturday, 2/13/21, at 0830. Sent to supervisor to create a slot and to add pt to 2/13 at 0830. Sent to MIRACLE that pt is going take the appt with him on 2/13 at 0830.

## 2021-02-23 ENCOUNTER — TELEPHONE (OUTPATIENT)
Dept: OBGYN CLINIC | Facility: CLINIC | Age: 31
End: 2021-02-23

## 2021-02-24 NOTE — TELEPHONE ENCOUNTER
Pt has been having spotting for over a month. Pt states she skipped the placebo pills on December to prevent period from coming for vacation. In January, pt took 2 placebo pills. Once her period started after the 2 pills, she started taking active pills again. Pt states she has been spotting on and off ever since. Pt informed this could be because she took the pill incorrectly. She should have taken the full week of placebo pills. Pt advised to continue taking the pills and take the full week of placebo pills with this pack. Pt asked if she should do this even with the spotting. Pt advised she needs to have her period and hopefully the spotting will resolve with the next pack. Pt asked if MIRACLE thinks she should be seen for this issue.   Message to MIRACLE.

## 2021-02-24 NOTE — TELEPHONE ENCOUNTER
Pt states she is concerned with continued spotting. Pt states she is going to go to  closer to her home today for spotting. Pt requesting appt for annual exam in 4/2021. Assisted pt with scheduling appt for 4/27/2021 at 320pm in [facility] with MIRACLE. Location information provided to pt. Pt verbalized understanding.

## 2021-02-24 NOTE — TELEPHONE ENCOUNTER
Pt is going to [facility] today in [place]. She is very concerned over her continued periods.

## 2021-03-01 NOTE — TELEPHONE ENCOUNTER
Patient calling stating she is sending an update on her UC visit. States the doctor took preg test and is neg. Patient is still spotting over the weekend. UC doctor feels it is due to her [facility].      Please advise

## 2021-03-02 NOTE — TELEPHONE ENCOUNTER
Pt states that the urgent care stated her estrogen is unbalanced. Pt to see MD about possibly a different ocp. Pt states that this has never happened to her before, the spotting that continues. Pt states that she has taken less sugar pills and never had the spotting. Pt is currently on active pills and will start sugar pills on 3/4. Pt will see MIRACLE for an appt for tomorrow on 3/3/21.

## 2021-03-03 ENCOUNTER — OFFICE VISIT (OUTPATIENT)
Dept: OBGYN CLINIC | Facility: CLINIC | Age: 31
End: 2021-03-03
Payer: MEDICAID

## 2021-03-03 VITALS
HEART RATE: 99 BPM | DIASTOLIC BLOOD PRESSURE: 68 MMHG | SYSTOLIC BLOOD PRESSURE: 129 MMHG | BODY MASS INDEX: 27 KG/M2 | WEIGHT: 162 LBS

## 2021-03-03 DIAGNOSIS — Z30.09 ENCOUNTER FOR GENERAL COUNSELING AND ADVICE ON CONTRACEPTIVE MANAGEMENT: ICD-10-CM

## 2021-03-03 DIAGNOSIS — N92.1 BREAKTHROUGH BLEEDING ON BIRTH CONTROL PILLS: Primary | ICD-10-CM

## 2021-03-03 PROCEDURE — 99213 OFFICE O/P EST LOW 20 MIN: CPT | Performed by: OBSTETRICS & GYNECOLOGY

## 2021-03-03 PROCEDURE — 3078F DIAST BP <80 MM HG: CPT | Performed by: OBSTETRICS & GYNECOLOGY

## 2021-03-03 PROCEDURE — 3074F SYST BP LT 130 MM HG: CPT | Performed by: OBSTETRICS & GYNECOLOGY

## 2021-03-03 RX ORDER — FLUCONAZOLE 150 MG/1
TABLET ORAL
COMMUNITY
Start: 2021-01-12 | End: 2021-03-18

## 2021-03-03 RX ORDER — NORGESTREL AND ETHINYL ESTRADIOL 0.3-0.03MG
1 KIT ORAL DAILY
Qty: 1 PACKAGE | Refills: 3 | Status: SHIPPED | OUTPATIENT
Start: 2021-03-07 | End: 2021-06-18

## 2021-03-14 PROBLEM — N92.1 BREAKTHROUGH BLEEDING ON BIRTH CONTROL PILLS: Status: ACTIVE | Noted: 2021-03-14

## 2021-03-15 NOTE — PROGRESS NOTES
HPI/Subjective:   Patient ID: Susy Hagan is a 27year old female. HPI  G4 [id number] with some breakthrough on continuous dose OCP. This was with ON Cyclen.  We had removed Mirena in August.     History/Other:   Review of Systems   Constitutional: N encounter. Meds This Visit:  Requested Prescriptions     Signed Prescriptions Disp Refills   • norgestrel-ethinyl estradiol (LOW-OGESTREL) 0.3-30 MG-MCG Oral Tab 1 Package 3     Sig: Take 1 tablet by mouth daily.        Imaging & Referrals:  None

## 2021-03-18 ENCOUNTER — TELEPHONE (OUTPATIENT)
Dept: OBGYN CLINIC | Facility: CLINIC | Age: 31
End: 2021-03-18

## 2021-03-18 ENCOUNTER — MOBILE ENCOUNTER (OUTPATIENT)
Dept: OBGYN CLINIC | Facility: CLINIC | Age: 31
End: 2021-03-18

## 2021-03-18 RX ORDER — FLUCONAZOLE 150 MG/1
150 TABLET ORAL ONCE
Qty: 1 TABLET | Refills: 0 | Status: SHIPPED | OUTPATIENT
Start: 2021-03-18 | End: 2021-03-18

## 2021-03-18 NOTE — TELEPHONE ENCOUNTER
Pt called c/o vaginal irritation and thick, clear, odorless discharge that started this morning. Pt denies pain, stated she has had recent intercourse without issues. Pt denies constipation and UTI s/s.  Pt states she has reoccurring yeast infections and OT

## 2021-03-19 NOTE — PROGRESS NOTES
Patient called requesting Diflucan. Had called office earlier without a response for pharmacy.  1 dose sent

## 2021-03-20 RX ORDER — FLUCONAZOLE 150 MG/1
150 TABLET ORAL ONCE
Qty: 2 TABLET | Refills: 1 | Status: SHIPPED | OUTPATIENT
Start: 2021-03-20 | End: 2021-03-20

## 2021-03-29 ENCOUNTER — TELEPHONE (OUTPATIENT)
Dept: OBGYN CLINIC | Facility: CLINIC | Age: 31
End: 2021-03-29

## 2021-03-29 NOTE — TELEPHONE ENCOUNTER
Pt looking for a referral for an allergist informed her she would most likely need to f/u with her pcp and states she doesn't have one and states MIRACLE knows about all her hive breakouts.  Please advise

## 2021-03-29 NOTE — TELEPHONE ENCOUNTER
Pt states she has been having \"an allergy problem for the last 2 yrs. \" Pt states prior to last pregnancy pt was feeling itchy all over and \"tingling sensation throughout my whole body. \" Pt states after delivery pt occ has the itchiness and tingling sen

## 2021-04-23 ENCOUNTER — TELEPHONE (OUTPATIENT)
Dept: OBGYN CLINIC | Facility: CLINIC | Age: 31
End: 2021-04-23

## 2021-04-23 NOTE — TELEPHONE ENCOUNTER
She can try OTC monistat or rephresh to help with symptoms until she has her appt with MIRACLE.  Yeast typically doesn't cause odor so would prefer culture

## 2021-04-23 NOTE — TELEPHONE ENCOUNTER
Patient calling stating having yeast infection symptoms. Would like Diflucan called in.     Please advise

## 2021-04-23 NOTE — TELEPHONE ENCOUNTER
Called pt and notified her of [address] [place] [address] Street, pt states she does not have an odor. That it is a thick, white discharge. Pt states she had tried OTC monistat and rephresh and they do not work for her.  Pt instructed that doctor does not wish to treat due to potent

## 2021-04-23 NOTE — TELEPHONE ENCOUNTER
Pt states yesterday she noticed a thick, clear discharge and a slight odor. Pt denies any itching. Pt also states it is uncomfortable during intercourse. Pt feels it is the start of a yeast infection.   Pt states otc creams never work and is asking for d

## 2021-04-23 NOTE — TELEPHONE ENCOUNTER
Pt informed ES will not give meds without a culture. Pt informed Flory Schwartz does have an appt with MIRACLE scheduled on Tuesday and cultures can be done then. Pt states she was not aware of that appt and thinks MIRACLE told her to skip it.   Pt advised that she sh

## 2021-04-29 ENCOUNTER — TELEPHONE (OUTPATIENT)
Dept: OBGYN CLINIC | Facility: CLINIC | Age: 31
End: 2021-04-29

## 2021-04-29 RX ORDER — FLUCONAZOLE 150 MG/1
150 TABLET ORAL ONCE
Qty: 1 TABLET | Refills: 0 | Status: SHIPPED | OUTPATIENT
Start: 2021-04-29 | End: 2021-04-29

## 2021-04-29 NOTE — TELEPHONE ENCOUNTER
Pt states vaginal thick white discharge. Denies a vaginal odor and vaginal irritation. Pt has tried otc treatments before with no relief. Sent to [facility] for recs.

## 2021-04-29 NOTE — TELEPHONE ENCOUNTER
Per pt is having discharge and wondering if something can be called in, states she has tried OTC treatments before with no relief.  Please advise

## 2021-04-29 NOTE — TELEPHONE ENCOUNTER
Informed pt MIRACLE is seeing pt's in clinic today and has not had a chance to respond. Informed pt we will call her as soon as MIRACLE responds. Pt agrees.

## 2021-04-30 NOTE — TELEPHONE ENCOUNTER
Talked to [facility] and she stated to send one rx for Diflucan and no refills.  Informed pt that it was sent and to call if she does not feel better to have an appt with MIRACLE.

## 2021-05-27 ENCOUNTER — HOSPITAL ENCOUNTER (OUTPATIENT)
Age: 31
Discharge: STILL ADMITTED OR ADMINISTRATIVE | End: 2021-05-27
Payer: MEDICAID

## 2021-06-15 ENCOUNTER — OFFICE VISIT (OUTPATIENT)
Dept: OBGYN CLINIC | Facility: CLINIC | Age: 31
End: 2021-06-15
Payer: MEDICAID

## 2021-06-15 ENCOUNTER — LAB ENCOUNTER (OUTPATIENT)
Dept: LAB | Facility: HOSPITAL | Age: 31
End: 2021-06-15
Attending: OBSTETRICS & GYNECOLOGY
Payer: MEDICAID

## 2021-06-15 VITALS
SYSTOLIC BLOOD PRESSURE: 124 MMHG | WEIGHT: 155.38 LBS | BODY MASS INDEX: 26 KG/M2 | DIASTOLIC BLOOD PRESSURE: 76 MMHG | HEART RATE: 93 BPM

## 2021-06-15 DIAGNOSIS — Z11.3 SCREENING FOR STD (SEXUALLY TRANSMITTED DISEASE): Primary | ICD-10-CM

## 2021-06-15 DIAGNOSIS — Z11.3 SCREENING FOR STD (SEXUALLY TRANSMITTED DISEASE): ICD-10-CM

## 2021-06-15 PROCEDURE — 3074F SYST BP LT 130 MM HG: CPT | Performed by: OBSTETRICS & GYNECOLOGY

## 2021-06-15 PROCEDURE — 99213 OFFICE O/P EST LOW 20 MIN: CPT | Performed by: OBSTETRICS & GYNECOLOGY

## 2021-06-15 PROCEDURE — 87389 HIV-1 AG W/HIV-1&-2 AB AG IA: CPT

## 2021-06-15 PROCEDURE — 80074 ACUTE HEPATITIS PANEL: CPT

## 2021-06-15 PROCEDURE — 3078F DIAST BP <80 MM HG: CPT | Performed by: OBSTETRICS & GYNECOLOGY

## 2021-06-15 PROCEDURE — 36415 COLL VENOUS BLD VENIPUNCTURE: CPT

## 2021-06-15 PROCEDURE — 86780 TREPONEMA PALLIDUM: CPT

## 2021-06-15 NOTE — H&P
HPI:  The patient is a 28 yo F here for STD screen. No complaints.       LPS: 7/3/19 pap neg    Reviewed medical and surgical history below       OBSTETRICS HISTORY:  OB History     T2    L2    SAB0  TAB0  Ectopic0  Multiple0  Live Births2 Active Member of Clubs or Organizations:       Attends Club or Organization Meetings:       Marital Status:   Intimate Partner Violence:       Fear of Current or Ex-Partner:       Emotionally Abused:       Physically Abused:       Sexually Abused:     Sally Anderson request.    Diagnoses and all orders for this visit:    Screening for STD (sexually transmitted disease)  -     HIV AG AB COMBO; Future  -     T PALLIDUM SCREENING CASCADE; Future  -     HEPATITIS PANEL, ACUTE (4);  Future      Cervical cultures collected a

## 2021-06-18 ENCOUNTER — TELEPHONE (OUTPATIENT)
Dept: OBGYN CLINIC | Facility: CLINIC | Age: 31
End: 2021-06-18

## 2021-06-18 DIAGNOSIS — Z78.9 USES ORAL CONTRACEPTIVES AS PRIMARY BIRTH CONTROL METHOD: Primary | ICD-10-CM

## 2021-06-18 RX ORDER — NORGESTREL AND ETHINYL ESTRADIOL 0.3-0.03MG
1 KIT ORAL DAILY
Qty: 56 TABLET | Refills: 0 | Status: SHIPPED | OUTPATIENT
Start: 2021-06-18 | End: 2021-07-21

## 2021-06-18 NOTE — TELEPHONE ENCOUNTER
Pt calling asking for a refill, states she was told my MA at her visit with [address] [place] [address] on 6/15/2021 that refill for OCP would be sent. Pt was informed she needed annual, pt was scheduled an appt on 7/21 with [address] [place] [address]. States she is out of OCP.   Called and spoke to [facility]

## 2021-06-18 NOTE — TELEPHONE ENCOUNTER
DR RAUSCH rx'ed her OCPs in March. I didn't. I saw her for STD screen. This should be sent to him.   She is past due for annual

## 2021-06-18 NOTE — TELEPHONE ENCOUNTER
Notes from office visit with [address] On 6/15/21 state  \"Pt past due for annual and will schedule. \" please assist pt with scheduling annual. Thanks.

## 2021-06-18 NOTE — TELEPHONE ENCOUNTER
Pt was not happy states she was told her refill would be sent, pt has annual scheduled 7/21 with ES and states shes completely out of [facility] and needs a refill.

## 2021-06-18 NOTE — TELEPHONE ENCOUNTER
Received fax from [facility] for refill on pts OCP Cryselle Tablets 28. Pt was seen on 6/15/2021 for Screening of STD, Annual and OCP Refill. Cultures collected that day negative for CHL/GC, Trich and HIV, Trep and Hep Panel negative.      ES notes stat

## 2021-06-18 NOTE — TELEPHONE ENCOUNTER
Pt called, states she is on the last week of her active pills. Pt informed that two packs of her OCP's have been sent to [facility] in [address] [place] to cover her till annual appt with [address] Pt states understanding.

## 2021-06-18 NOTE — TELEPHONE ENCOUNTER
Called and spoke to Tonny at [place] [address]. RX denied, informed pt needs to call office for appt.

## 2021-06-21 ENCOUNTER — TELEPHONE (OUTPATIENT)
Dept: OBGYN CLINIC | Facility: CLINIC | Age: 31
End: 2021-06-21

## 2021-06-21 NOTE — TELEPHONE ENCOUNTER
Pt reports she saw ES last week and ES was going to send Diflucan rx. Pt reports vaginal irritation only during IC. Reports white and creamy discharge. Denies itching. States she has not tried any other tx because only the pill works for her.    Informed

## 2021-06-21 NOTE — TELEPHONE ENCOUNTER
Per pt at last visit with [address] stated she mentioned having yeast like symptoms and he was going to call in a prescription for a pill, pt calling to f/u.  Please advise

## 2021-06-22 ENCOUNTER — TELEPHONE (OUTPATIENT)
Dept: OBGYN CLINIC | Facility: CLINIC | Age: 31
End: 2021-06-22

## 2021-06-22 NOTE — TELEPHONE ENCOUNTER
The patient didn’t report any of the aforementioned. She made an appointment for std screening only and was without complaints. She can come in for a culture or go to an urgent care for culture/treatment. No meds without culture.  I would have done a genita

## 2021-06-22 NOTE — TELEPHONE ENCOUNTER
Pt informed of mAZs recs and verbalized understanding. Pt also informed she can try OTC Monistat 3 or 7. Pt stated \"that wont do her any good\". Pt stated she will just send a message to MIRACLE for Diflucan.  explained to pt that [address] [place] [address] already stated that medic

## 2021-06-22 NOTE — TELEPHONE ENCOUNTER
Pt reports creamy, white discharge with vaginal irritation only during IC. Pt requesting Diflucan from MIRACLE, since [address] [place] [address] request that pt come in for cultures.  Pt informed that MIRACLE is not in office and message will be sent to MIRACLE if he would consider sending R

## 2021-06-23 RX ORDER — FLUCONAZOLE 150 MG/1
150 TABLET ORAL ONCE
Qty: 1 TABLET | Refills: 0 | Status: SHIPPED | OUTPATIENT
Start: 2021-06-23 | End: 2021-06-23

## 2021-06-23 NOTE — TELEPHONE ENCOUNTER
She has been prescribed Diflucan 6 times since last June. No cultures done. Either there is a problem that is not getting resolved or we are using wrong therapy. OK for Diflucan now but she needs full exam as well as culture with any future symptoms.

## 2021-07-21 ENCOUNTER — LAB ENCOUNTER (OUTPATIENT)
Dept: LAB | Facility: HOSPITAL | Age: 31
End: 2021-07-21
Attending: OBSTETRICS & GYNECOLOGY
Payer: MEDICAID

## 2021-07-21 ENCOUNTER — OFFICE VISIT (OUTPATIENT)
Dept: OBGYN CLINIC | Facility: CLINIC | Age: 31
End: 2021-07-21
Payer: MEDICAID

## 2021-07-21 VITALS
SYSTOLIC BLOOD PRESSURE: 116 MMHG | HEART RATE: 89 BPM | WEIGHT: 149.81 LBS | DIASTOLIC BLOOD PRESSURE: 78 MMHG | BODY MASS INDEX: 25 KG/M2

## 2021-07-21 DIAGNOSIS — Z11.3 SCREENING FOR STD (SEXUALLY TRANSMITTED DISEASE): ICD-10-CM

## 2021-07-21 DIAGNOSIS — Z12.4 SCREENING FOR MALIGNANT NEOPLASM OF CERVIX: ICD-10-CM

## 2021-07-21 DIAGNOSIS — Z11.3 SCREENING FOR STD (SEXUALLY TRANSMITTED DISEASE): Primary | ICD-10-CM

## 2021-07-21 DIAGNOSIS — Z01.419 WELL WOMAN EXAM: ICD-10-CM

## 2021-07-21 DIAGNOSIS — Z78.9 USES ORAL CONTRACEPTIVES AS PRIMARY BIRTH CONTROL METHOD: ICD-10-CM

## 2021-07-21 DIAGNOSIS — N89.8 VAGINAL IRRITATION: ICD-10-CM

## 2021-07-21 LAB
HAV IGM SER QL: NONREACTIVE
HBV CORE IGM SER QL: NONREACTIVE
HBV SURFACE AG SERPL QL IA: NONREACTIVE
HCV AB SERPL QL IA: NONREACTIVE

## 2021-07-21 PROCEDURE — 87389 HIV-1 AG W/HIV-1&-2 AB AG IA: CPT

## 2021-07-21 PROCEDURE — 36415 COLL VENOUS BLD VENIPUNCTURE: CPT

## 2021-07-21 PROCEDURE — 99212 OFFICE O/P EST SF 10 MIN: CPT | Performed by: OBSTETRICS & GYNECOLOGY

## 2021-07-21 PROCEDURE — 80074 ACUTE HEPATITIS PANEL: CPT

## 2021-07-21 PROCEDURE — 86780 TREPONEMA PALLIDUM: CPT

## 2021-07-21 PROCEDURE — 3074F SYST BP LT 130 MM HG: CPT | Performed by: OBSTETRICS & GYNECOLOGY

## 2021-07-21 PROCEDURE — 99395 PREV VISIT EST AGE 18-39: CPT | Performed by: OBSTETRICS & GYNECOLOGY

## 2021-07-21 PROCEDURE — 3078F DIAST BP <80 MM HG: CPT | Performed by: OBSTETRICS & GYNECOLOGY

## 2021-07-21 RX ORDER — NORGESTREL AND ETHINYL ESTRADIOL 0.3-0.03MG
1 KIT ORAL DAILY
Qty: 3 EACH | Refills: 3 | Status: SHIPPED | OUTPATIENT
Start: 2021-07-21

## 2021-07-21 NOTE — H&P
HPI:  The patient is a 28 yo F here for WWE. Cycles monthly on OCPs. Wants STD screen--boyfriend cheated. Having intermittent vaginal itching and irritation. No odor or dc. Pt with sore throat and HA; started 10 days ago.   Took at home covid test f Activity:       Days of Exercise per Week:       Minutes of Exercise per Session:   Stress:       Feeling of Stress :   Social Connections:       Frequency of Communication with Friends and Family:       Frequency of Social Gatherings with Friends and Fami is noted  Breast: normal without palpable masses, tenderness, asymmetry, nipple discharge, nipple retraction or skin changes  Abdomen:  soft, nontender, nondistended, no masses  Skin/Hair: no unusual rashes or bruises  Extremities: no edema, no cyanosis  P

## 2021-07-22 ENCOUNTER — TELEPHONE (OUTPATIENT)
Dept: OBGYN CLINIC | Facility: CLINIC | Age: 31
End: 2021-07-22

## 2021-07-22 LAB
C TRACH DNA SPEC QL NAA+PROBE: POSITIVE
N GONORRHOEA DNA SPEC QL NAA+PROBE: NEGATIVE

## 2021-07-22 RX ORDER — AZITHROMYCIN 500 MG/1
TABLET, FILM COATED ORAL
Qty: 2 TABLET | Refills: 0 | Status: SHIPPED | OUTPATIENT
Start: 2021-07-22

## 2021-07-22 NOTE — TELEPHONE ENCOUNTER
Informed pt that her Chlamydia was positive. Informed pt that Azithromycin 1 g was sent to pharmacy and to take it po x 1 dose. Informed pt that her partner needs to be notified and treated. Informed pt to return to office in 3 weeks.   Informed pt pelvic r

## 2021-07-22 NOTE — TELEPHONE ENCOUNTER
Lab called and stated pt's Chlamydia from yesterday, 7/21, was positive. Sent to [facility] for recs.

## 2021-07-23 ENCOUNTER — TELEPHONE (OUTPATIENT)
Dept: OBGYN CLINIC | Facility: CLINIC | Age: 31
End: 2021-07-23

## 2021-07-23 DIAGNOSIS — B37.9 YEAST INFECTION: Primary | ICD-10-CM

## 2021-07-23 LAB
GENITAL VAGINOSIS SCREEN: NEGATIVE
T PALLIDUM AB SER QL: NEGATIVE
TRICHOMONAS SCREEN: NEGATIVE

## 2021-07-23 RX ORDER — FLUCONAZOLE 150 MG/1
150 TABLET ORAL
Qty: 2 TABLET | Refills: 0 | Status: SHIPPED | OUTPATIENT
Start: 2021-07-23

## 2021-07-23 NOTE — TELEPHONE ENCOUNTER
Pt called and informed that Trich, Hep panel, HIV were all negative. Pt informed of +yeast and that Diflucan would be sent over, one pill now with second one in 72 hrs. Pt informed that Trep, PAP and HPV in-process.  Pharmacy confirmed and pt states underst

## 2021-07-23 NOTE — TELEPHONE ENCOUNTER
Patient has questions regarding test that were done. Wants to know if Virl Conejos was done as well.      Please advise

## 2021-07-23 NOTE — TELEPHONE ENCOUNTER
Pt called for 3 week GERMÁN appt for +Chlamdyia. ES schedule checked at all locations, only available slots are New OB, OB and NP. Pt informed will route to [facility] to see where pt could be added to schedule. Pt states understanding.      Pt states she took DONALD Welsh

## 2021-07-26 LAB — HPV I/H RISK 1 DNA SPEC QL NAA+PROBE: NEGATIVE

## 2021-07-27 ENCOUNTER — TELEPHONE (OUTPATIENT)
Dept: OBGYN CLINIC | Facility: CLINIC | Age: 31
End: 2021-07-27

## 2021-07-27 NOTE — TELEPHONE ENCOUNTER
Pt was dx with genital chlamydia 7/21/21. Pt states her  had oral sex a month ago with a woman who had Chlamydia.  denies having IC with the woman. Pt had IC with her  and then developed symptoms. Pt states she did have a sore throat and a red spot on her tongue when she came in. Pt states symptoms resolved with the medication to treat the genital chlamydia. Pt now c/o her upper back and neck are aching again as if she pulled a muscle. Pt was advised to contact her pcp for evaluation or go to the Immediate Care. Pt would like to know if she can get genital chlamydia if her  only had oral sex with this woman. Pt was advised ES is out of the office until Thursday but msg will be sent to him. Pt agrees with plan.

## 2021-07-27 NOTE — TELEPHONE ENCOUNTER
Pt is asking if she can get genital chlamydia from her  if he only had oral sex with another woman, and then pt and  only had vaginal intercourse?

## 2021-07-27 NOTE — TELEPHONE ENCOUNTER
LMTCB. Clarified message with [address] who stated that its possible but unless  performed oral on his wife, it more likely came from genital source.

## 2021-08-09 ENCOUNTER — TELEPHONE (OUTPATIENT)
Dept: OBGYN CLINIC | Facility: CLINIC | Age: 31
End: 2021-08-09

## 2021-08-09 NOTE — TELEPHONE ENCOUNTER
Pt aware Doxy will treat but she states she does not want to take abx a full week. Requesting a one time dose. Denied?

## 2021-08-09 NOTE — TELEPHONE ENCOUNTER
Pt was in IM care they gave her  7 day medication for Chlamydia and she is asking for DR Ginger Fuller to gvie her the 2 day pill she had before , Pt don't want to take for 7 days

## 2021-08-09 NOTE — TELEPHONE ENCOUNTER
Informed pt that Adriana Connelly stated he does not have access to the results. Informed pt that her request was denied. Informed pt that she can take the Doxy and follow up in 3 weeks for test of cure. Offered to help the pt schedule an appt for test of cure in 3 weeks. Pt states that she will call back and schedule it.

## 2021-08-09 NOTE — TELEPHONE ENCOUNTER
Pt saw [address] [place] [address] 7/21 and was + for Chlamydia. Treated with 1 gram of Azithro. Pt states she and her fiance took meds same day but they only waited 2 days before resuming IC. Her symptoms of \"burning and pain\" returned so she went to a [facility]mediate care in [place] yesterday. Positive for Chlamydia. Given 7 day rx of Doxycycline. She is requesting the one time Azithro dose instead. Routed to [address] [place] [address] for review. Thanks.

## 2021-08-09 NOTE — TELEPHONE ENCOUNTER
I dont have access to the results. Denied.  She can take the doxy and follow up in 3 weeks for test of cure

## 2021-08-11 RX ORDER — AZITHROMYCIN 500 MG/1
TABLET, FILM COATED ORAL
Qty: 2 TABLET | Refills: 0 | OUTPATIENT
Start: 2021-08-11

## 2022-01-10 ENCOUNTER — TELEMEDICINE (OUTPATIENT)
Dept: INTERNAL MEDICINE CLINIC | Facility: CLINIC | Age: 32
End: 2022-01-10

## 2022-01-10 DIAGNOSIS — U09.9 POST COVID-19 CONDITION, UNSPECIFIED: Primary | ICD-10-CM

## 2022-01-10 PROCEDURE — 99213 OFFICE O/P EST LOW 20 MIN: CPT | Performed by: PHYSICIAN ASSISTANT

## 2022-01-10 NOTE — PROGRESS NOTES
Please note that the following visit was completed using two-way, real-time interactive audio and/or video communication.   This has been done in good am[place] to provide continuity of care in the best interest of the provider-patient relationship, due to the HIVES  Shellfish-Derived P*    HIVES       Current Outpatient Medications:   •  fluconazole (DIFLUCAN) 150 MG Oral Tab, Take 1 tablet (150 mg total) by mouth every third day., Disp: 2 tablet, Rfl: 0  •  azithromycin 500 MG Oral Tab, Take 1 gram of Azithrom

## 2022-02-22 ENCOUNTER — OFFICE VISIT (OUTPATIENT)
Dept: INTERNAL MEDICINE CLINIC | Facility: CLINIC | Age: 32
End: 2022-02-22
Payer: MEDICAID

## 2022-02-22 VITALS
HEIGHT: 65.5 IN | BODY MASS INDEX: 22.88 KG/M2 | SYSTOLIC BLOOD PRESSURE: 115 MMHG | DIASTOLIC BLOOD PRESSURE: 70 MMHG | WEIGHT: 139 LBS | HEART RATE: 111 BPM

## 2022-02-22 DIAGNOSIS — Z00.00 PHYSICAL EXAM: Primary | ICD-10-CM

## 2022-02-22 DIAGNOSIS — N89.8 VAGINAL IRRITATION: ICD-10-CM

## 2022-02-22 DIAGNOSIS — Z11.3 SCREENING FOR STD (SEXUALLY TRANSMITTED DISEASE): ICD-10-CM

## 2022-02-22 PROCEDURE — 3078F DIAST BP <80 MM HG: CPT | Performed by: PHYSICIAN ASSISTANT

## 2022-02-22 PROCEDURE — 3008F BODY MASS INDEX DOCD: CPT | Performed by: PHYSICIAN ASSISTANT

## 2022-02-22 PROCEDURE — 3074F SYST BP LT 130 MM HG: CPT | Performed by: PHYSICIAN ASSISTANT

## 2022-02-22 PROCEDURE — 99395 PREV VISIT EST AGE 18-39: CPT | Performed by: PHYSICIAN ASSISTANT

## 2022-02-24 LAB
ABSOLUTE BASOPHILS: 31 CELLS/UL (ref 0–200)
ABSOLUTE EOSINOPHILS: 70 CELLS/UL (ref 15–500)
ABSOLUTE LYMPHOCYTES: 1911 CELLS/UL (ref 850–3900)
ABSOLUTE MONOCYTES: 445 CELLS/UL (ref 200–950)
ABSOLUTE NEUTROPHILS: 5343 CELLS/UL (ref 1500–7800)
ALBUMIN/GLOBULIN RATIO: 1.6 (CALC) (ref 1–2.5)
ALBUMIN: 4.4 G/DL (ref 3.6–5.1)
ALKALINE PHOSPHATASE: 52 U/L (ref 31–125)
ALT: 14 U/L (ref 6–29)
APPEARANCE: CLEAR
AST: 17 U/L (ref 10–30)
BASOPHILS: 0.4 %
BILIRUBIN, TOTAL: 0.6 MG/DL (ref 0.2–1.2)
BILIRUBIN: NEGATIVE
BUN: 10 MG/DL (ref 7–25)
CALCIUM: 9.2 MG/DL (ref 8.6–10.2)
CARBON DIOXIDE: 25 MMOL/L (ref 20–32)
CHLAMYDIA TRACHOMATIS$RNA, TMA: NOT DETECTED
CHLORIDE: 102 MMOL/L (ref 98–110)
CHOL/HDLC RATIO: 2 (CALC)
CHOLESTEROL, TOTAL: 152 MG/DL
COLOR: YELLOW
CREATININE: 0.78 MG/DL (ref 0.5–1.1)
EGFR IF AFRICN AM: 117 ML/MIN/1.73M2
EGFR IF NONAFRICN AM: 101 ML/MIN/1.73M2
EOSINOPHILS: 0.9 %
GLOBULIN: 2.8 G/DL (CALC) (ref 1.9–3.7)
GLUCOSE: 93 MG/DL (ref 65–99)
GLUCOSE: NEGATIVE
HDL CHOLESTEROL: 77 MG/DL
HEMATOCRIT: 40.2 % (ref 35–45)
HEMOGLOBIN: 13.6 G/DL (ref 11.7–15.5)
KETONES: NEGATIVE
LDL-CHOLESTEROL: 62 MG/DL (CALC)
LYMPHOCYTES: 24.5 %
MCH: 32.4 PG (ref 27–33)
MCHC: 33.8 G/DL (ref 32–36)
MCV: 95.7 FL (ref 80–100)
MONOCYTES: 5.7 %
MPV: 10.6 FL (ref 7.5–12.5)
NEISSERIA GONORRHOEAE$RNA, TMA: NOT DETECTED
NEUTROPHILS: 68.5 %
NITRITE: NEGATIVE
NON-HDL CHOLESTEROL: 75 MG/DL (CALC)
OCCULT BLOOD: NEGATIVE
PH: 6 (ref 5–8)
PLATELET COUNT: 312 THOUSAND/UL (ref 140–400)
POTASSIUM: 4 MMOL/L (ref 3.5–5.3)
PROTEIN, TOTAL: 7.2 G/DL (ref 6.1–8.1)
PROTEIN: NEGATIVE
RDW: 11.9 % (ref 11–15)
RED BLOOD CELL COUNT: 4.2 MILLION/UL (ref 3.8–5.1)
SIGNAL TO CUT-OFF: 0.05
SODIUM: 137 MMOL/L (ref 135–146)
SPECIFIC GRAVITY: 1.02 (ref 1–1.03)
TRIGLYCERIDES: 53 MG/DL
TSH W/REFLEX TO FT4: 1.1 MIU/L
WHITE BLOOD CELL COUNT: 7.8 THOUSAND/UL (ref 3.8–10.8)

## 2022-04-20 ENCOUNTER — TELEPHONE (OUTPATIENT)
Dept: INTERNAL MEDICINE CLINIC | Facility: CLINIC | Age: 32
End: 2022-04-20

## 2022-04-20 RX ORDER — FLUCONAZOLE 150 MG/1
TABLET ORAL
Qty: 1 TABLET | Refills: 0 | OUTPATIENT
Start: 2022-04-20

## 2022-04-27 ENCOUNTER — MED REC SCAN ONLY (OUTPATIENT)
Dept: INTERNAL MEDICINE CLINIC | Facility: CLINIC | Age: 32
End: 2022-04-27

## 2022-05-05 ENCOUNTER — OFFICE VISIT (OUTPATIENT)
Dept: OBGYN CLINIC | Facility: CLINIC | Age: 32
End: 2022-05-05
Payer: MEDICAID

## 2022-05-05 ENCOUNTER — OFFICE VISIT (OUTPATIENT)
Dept: INTERNAL MEDICINE CLINIC | Facility: CLINIC | Age: 32
End: 2022-05-05
Payer: MEDICAID

## 2022-05-05 ENCOUNTER — LAB ENCOUNTER (OUTPATIENT)
Dept: LAB | Age: 32
End: 2022-05-05
Attending: OBSTETRICS & GYNECOLOGY
Payer: MEDICAID

## 2022-05-05 VITALS
DIASTOLIC BLOOD PRESSURE: 72 MMHG | TEMPERATURE: 98 F | BODY MASS INDEX: 23.21 KG/M2 | WEIGHT: 141 LBS | RESPIRATION RATE: 17 BRPM | OXYGEN SATURATION: 98 % | SYSTOLIC BLOOD PRESSURE: 116 MMHG | HEIGHT: 65.5 IN | HEART RATE: 67 BPM

## 2022-05-05 VITALS
DIASTOLIC BLOOD PRESSURE: 75 MMHG | SYSTOLIC BLOOD PRESSURE: 123 MMHG | HEART RATE: 76 BPM | WEIGHT: 142 LBS | BODY MASS INDEX: 23 KG/M2

## 2022-05-05 DIAGNOSIS — R22.1 LOCALIZED SWELLING, MASS AND LUMP, NECK: Primary | ICD-10-CM

## 2022-05-05 DIAGNOSIS — Z11.3 VENEREAL DISEASE SCREENING: ICD-10-CM

## 2022-05-05 DIAGNOSIS — B37.3 CANDIDAL VULVOVAGINITIS: Primary | ICD-10-CM

## 2022-05-05 PROBLEM — N76.0 VULVOVAGINITIS: Status: ACTIVE | Noted: 2022-05-05

## 2022-05-05 LAB
HBV SURFACE AG SER-ACNC: <0.1 [IU]/L
HBV SURFACE AG SERPL QL IA: NONREACTIVE
HCV AB SERPL QL IA: NONREACTIVE

## 2022-05-05 PROCEDURE — 99213 OFFICE O/P EST LOW 20 MIN: CPT | Performed by: OBSTETRICS & GYNECOLOGY

## 2022-05-05 PROCEDURE — 87340 HEPATITIS B SURFACE AG IA: CPT | Performed by: OBSTETRICS & GYNECOLOGY

## 2022-05-05 PROCEDURE — 3074F SYST BP LT 130 MM HG: CPT | Performed by: OBSTETRICS & GYNECOLOGY

## 2022-05-05 PROCEDURE — 3078F DIAST BP <80 MM HG: CPT | Performed by: OBSTETRICS & GYNECOLOGY

## 2022-05-05 PROCEDURE — 86803 HEPATITIS C AB TEST: CPT | Performed by: OBSTETRICS & GYNECOLOGY

## 2022-05-05 PROCEDURE — 99214 OFFICE O/P EST MOD 30 MIN: CPT | Performed by: INTERNAL MEDICINE

## 2022-05-05 PROCEDURE — 3074F SYST BP LT 130 MM HG: CPT | Performed by: INTERNAL MEDICINE

## 2022-05-05 PROCEDURE — 3008F BODY MASS INDEX DOCD: CPT | Performed by: INTERNAL MEDICINE

## 2022-05-05 PROCEDURE — 3078F DIAST BP <80 MM HG: CPT | Performed by: INTERNAL MEDICINE

## 2022-05-05 PROCEDURE — 86780 TREPONEMA PALLIDUM: CPT | Performed by: OBSTETRICS & GYNECOLOGY

## 2022-05-05 PROCEDURE — 87389 HIV-1 AG W/HIV-1&-2 AB AG IA: CPT | Performed by: OBSTETRICS & GYNECOLOGY

## 2022-05-05 PROCEDURE — 36415 COLL VENOUS BLD VENIPUNCTURE: CPT | Performed by: OBSTETRICS & GYNECOLOGY

## 2022-05-05 RX ORDER — FLUCONAZOLE 150 MG/1
150 TABLET ORAL ONCE
Qty: 2 TABLET | Refills: 0 | Status: SHIPPED | OUTPATIENT
Start: 2022-05-05 | End: 2022-05-05

## 2022-05-05 RX ORDER — SULFAMETHOXAZOLE AND TRIMETHOPRIM 800; 160 MG/1; MG/1
1 TABLET ORAL 2 TIMES DAILY
Qty: 14 TABLET | Refills: 0 | Status: SHIPPED | OUTPATIENT
Start: 2022-05-05 | End: 2022-05-12

## 2022-05-06 LAB
C TRACH DNA SPEC QL NAA+PROBE: NEGATIVE
N GONORRHOEA DNA SPEC QL NAA+PROBE: NEGATIVE
T PALLIDUM AB SER QL: NEGATIVE
T VAGINALIS RRNA SPEC QL NAA+PROBE: NEGATIVE

## 2022-05-31 DIAGNOSIS — Z78.9 USES ORAL CONTRACEPTIVES AS PRIMARY BIRTH CONTROL METHOD: ICD-10-CM

## 2022-05-31 RX ORDER — NORGESTREL AND ETHINYL ESTRADIOL 0.3-0.03MG
1 KIT ORAL DAILY
Qty: 84 TABLET | Refills: 0 | Status: SHIPPED | OUTPATIENT
Start: 2022-05-31

## 2022-08-04 ENCOUNTER — TELEPHONE (OUTPATIENT)
Dept: OBGYN CLINIC | Facility: CLINIC | Age: 32
End: 2022-08-04

## 2022-08-04 DIAGNOSIS — Z78.9 USES ORAL CONTRACEPTIVES AS PRIMARY BIRTH CONTROL METHOD: ICD-10-CM

## 2022-08-04 RX ORDER — NORGESTREL-ETHINYL ESTRADIOL 0.3-0.03MG
1 TABLET ORAL DAILY
Qty: 28 TABLET | Refills: 1 | Status: SHIPPED | OUTPATIENT
Start: 2022-08-04 | End: 2022-09-03

## 2022-09-03 RX ORDER — NORGESTREL-ETHINYL ESTRADIOL 0.3-0.03MG
1 TABLET ORAL DAILY
Qty: 28 TABLET | Refills: 0 | Status: SHIPPED | OUTPATIENT
Start: 2022-09-03

## 2022-09-03 NOTE — TELEPHONE ENCOUNTER
Pt calling for refill of her OCP's until her annual. Pt informed that one pack with one refill was sent on 8/4 to cover her until her appt. Pt states she received one pack and indicated she did not have any refills left. Pharmacy called, states they gave pt one pack and order does not indicate one refill. Once pack sent to pharmacy to cover pt until her 9/13 appt with ES. Pharmacy indicates they received order.

## 2022-09-15 ENCOUNTER — LAB ENCOUNTER (OUTPATIENT)
Dept: LAB | Facility: HOSPITAL | Age: 32
End: 2022-09-15
Attending: NURSE PRACTITIONER
Payer: MEDICAID

## 2022-09-15 ENCOUNTER — OFFICE VISIT (OUTPATIENT)
Dept: OBGYN CLINIC | Facility: CLINIC | Age: 32
End: 2022-09-15
Payer: MEDICAID

## 2022-09-15 VITALS
WEIGHT: 154.81 LBS | HEART RATE: 91 BPM | BODY MASS INDEX: 25 KG/M2 | DIASTOLIC BLOOD PRESSURE: 77 MMHG | SYSTOLIC BLOOD PRESSURE: 127 MMHG

## 2022-09-15 DIAGNOSIS — Z01.419 WELL WOMAN EXAM WITH ROUTINE GYNECOLOGICAL EXAM: Primary | ICD-10-CM

## 2022-09-15 DIAGNOSIS — Z11.3 ROUTINE SCREENING FOR STI (SEXUALLY TRANSMITTED INFECTION): ICD-10-CM

## 2022-09-15 DIAGNOSIS — Z30.41 ORAL CONTRACEPTIVE PILL SURVEILLANCE: ICD-10-CM

## 2022-09-15 DIAGNOSIS — Z78.9 USES ORAL CONTRACEPTIVES AS PRIMARY BIRTH CONTROL METHOD: ICD-10-CM

## 2022-09-15 LAB
HBV SURFACE AG SER-ACNC: 0.12 [IU]/L
HBV SURFACE AG SERPL QL IA: NONREACTIVE
HCV AB SERPL QL IA: NONREACTIVE

## 2022-09-15 PROCEDURE — 3074F SYST BP LT 130 MM HG: CPT | Performed by: NURSE PRACTITIONER

## 2022-09-15 PROCEDURE — 86780 TREPONEMA PALLIDUM: CPT

## 2022-09-15 PROCEDURE — 87389 HIV-1 AG W/HIV-1&-2 AB AG IA: CPT

## 2022-09-15 PROCEDURE — 87340 HEPATITIS B SURFACE AG IA: CPT

## 2022-09-15 PROCEDURE — 36415 COLL VENOUS BLD VENIPUNCTURE: CPT

## 2022-09-15 PROCEDURE — 99395 PREV VISIT EST AGE 18-39: CPT | Performed by: NURSE PRACTITIONER

## 2022-09-15 PROCEDURE — 86803 HEPATITIS C AB TEST: CPT

## 2022-09-15 PROCEDURE — 3078F DIAST BP <80 MM HG: CPT | Performed by: NURSE PRACTITIONER

## 2022-09-15 RX ORDER — NORGESTREL-ETHINYL ESTRADIOL 0.3-0.03MG
1 TABLET ORAL DAILY
Qty: 84 TABLET | Refills: 3 | Status: SHIPPED | OUTPATIENT
Start: 2022-09-15

## 2023-01-24 ENCOUNTER — HOSPITAL ENCOUNTER (EMERGENCY)
Facility: HOSPITAL | Age: 33
Discharge: HOME | End: 2023-01-24
Attending: EMERGENCY MEDICINE
Payer: MEDICAID

## 2023-01-24 VITALS
DIASTOLIC BLOOD PRESSURE: 76 MMHG | HEART RATE: 79 BPM | TEMPERATURE: 98 F | WEIGHT: 154.31 LBS | HEIGHT: 66 IN | BODY MASS INDEX: 24.8 KG/M2 | SYSTOLIC BLOOD PRESSURE: 126 MMHG | RESPIRATION RATE: 22 BRPM | OXYGEN SATURATION: 98 %

## 2023-01-24 DIAGNOSIS — N93.8 DYSFUNCTIONAL UTERINE BLEEDING: Primary | ICD-10-CM

## 2023-01-24 DIAGNOSIS — R30.0 DYSURIA: ICD-10-CM

## 2023-01-24 LAB
B-HCG UR QL: NEGATIVE
BILIRUB UR QL: NEGATIVE
CLARITY UR: CLEAR
COLOR UR: YELLOW
GLUCOSE UR-MCNC: NEGATIVE MG/DL
KETONES UR-MCNC: NEGATIVE MG/DL
LEUKOCYTE ESTERASE UR QL STRIP.AUTO: NEGATIVE
NITRITE UR QL STRIP.AUTO: NEGATIVE
PH UR: 5.5 [PH] (ref 5–8)
SP GR UR STRIP: >=1.03 (ref 1–1.03)
UROBILINOGEN UR STRIP-ACNC: 0.2

## 2023-01-24 PROCEDURE — 99283 EMERGENCY DEPT VISIT LOW MDM: CPT

## 2023-01-24 PROCEDURE — 81015 MICROSCOPIC EXAM OF URINE: CPT | Performed by: EMERGENCY MEDICINE

## 2023-01-24 PROCEDURE — 81025 URINE PREGNANCY TEST: CPT

## 2023-01-24 PROCEDURE — 99284 EMERGENCY DEPT VISIT MOD MDM: CPT

## 2023-01-24 PROCEDURE — 81001 URINALYSIS AUTO W/SCOPE: CPT | Performed by: EMERGENCY MEDICINE

## 2023-01-24 RX ORDER — NITROFURANTOIN 25; 75 MG/1; MG/1
100 CAPSULE ORAL 2 TIMES DAILY
Qty: 10 CAPSULE | Refills: 0 | Status: SHIPPED | OUTPATIENT
Start: 2023-01-24 | End: 2023-01-29

## 2023-01-24 RX ORDER — KETOROLAC TROMETHAMINE 10 MG/1
10 TABLET, FILM COATED ORAL EVERY 6 HOURS PRN
Qty: 15 TABLET | Refills: 0 | Status: SHIPPED | OUTPATIENT
Start: 2023-01-24 | End: 2023-01-31

## 2023-01-25 NOTE — ED INITIAL ASSESSMENT (HPI)
Pt to ED /w c/o vaginal bleeding x 3 weeks. Pt is axox4. Endorses lower abd cramping /w dysuria. Pt concerned for a miscarriage. Unsure if ever pregnant recently.

## 2023-01-31 ENCOUNTER — TELEPHONE (OUTPATIENT)
Dept: OBGYN CLINIC | Facility: CLINIC | Age: 33
End: 2023-01-31

## 2023-01-31 NOTE — TELEPHONE ENCOUNTER
Patient has been bleeding and spotting any time she has intercourse. She also has slight cramping. This has been occurring for the past few of weeks. She was recently seen in the ER for this and was told that it was due to her getting off track with her birth control. Please advise.

## 2023-01-31 NOTE — TELEPHONE ENCOUNTER
Seen in ER on 1/24 for irregular bleeding. Indicates LMP approx 1/14 that lasted until 1/21. Pt states she had IC with BF on 1/22 and noted \"uncomfortable feeling\". Pt state after IC her cycles started again with abdominal cramping that caused her to go to ER for eval. Pt states she is unsure if she took 2 or 3 OCP's on the 14th stating she could not remember if she took the pill the day before and took extra \"to be sure I was covered\". Pt state she forgot to label the dates on her OCP's so is often behind with taking them. Since the 22nd cycle has come and gone, from little flow to spotting. Had IC with BF last night and states light flow with some mild cramping today. Pt informed that is she is not taking OCP's daily she will throw off her cycles and create this abnormal bleeding and/or spotting. Pt informed she should never take more then 2 OCP in one day. Pt states she is now taking one a day at the same time everyday. Pt informed can continue to monitor, gave bleeding and pain precautions. Pt still asking what to do for bleeding post IC? Informed to come in office for exam if IC is uncomfortable and notes bleeding after. Pt accepts appt with [facility] tomorrow at 8 am.     UPT in ER was negative.

## 2023-02-14 ENCOUNTER — OFFICE VISIT (OUTPATIENT)
Dept: OBGYN CLINIC | Facility: CLINIC | Age: 33
End: 2023-02-14

## 2023-02-14 ENCOUNTER — LAB ENCOUNTER (OUTPATIENT)
Dept: LAB | Facility: HOSPITAL | Age: 33
End: 2023-02-14
Attending: OBSTETRICS & GYNECOLOGY
Payer: MEDICAID

## 2023-02-14 ENCOUNTER — TELEPHONE (OUTPATIENT)
Dept: OBGYN CLINIC | Facility: CLINIC | Age: 33
End: 2023-02-14

## 2023-02-14 VITALS
SYSTOLIC BLOOD PRESSURE: 120 MMHG | HEART RATE: 91 BPM | BODY MASS INDEX: 27 KG/M2 | DIASTOLIC BLOOD PRESSURE: 72 MMHG | WEIGHT: 165 LBS

## 2023-02-14 DIAGNOSIS — N92.6 IRREGULAR BLEEDING: Primary | ICD-10-CM

## 2023-02-14 DIAGNOSIS — N92.0 SPOTTING: ICD-10-CM

## 2023-02-14 DIAGNOSIS — N92.6 IRREGULAR BLEEDING: ICD-10-CM

## 2023-02-14 LAB
HCG SERPL QL: NEGATIVE
T4 FREE SERPL-MCNC: 1 NG/DL (ref 0.8–1.7)
TSI SER-ACNC: 0.74 MIU/ML (ref 0.36–3.74)

## 2023-02-14 PROCEDURE — 84439 ASSAY OF FREE THYROXINE: CPT

## 2023-02-14 PROCEDURE — 3074F SYST BP LT 130 MM HG: CPT | Performed by: OBSTETRICS & GYNECOLOGY

## 2023-02-14 PROCEDURE — 87591 N.GONORRHOEAE DNA AMP PROB: CPT

## 2023-02-14 PROCEDURE — 84443 ASSAY THYROID STIM HORMONE: CPT

## 2023-02-14 PROCEDURE — 99213 OFFICE O/P EST LOW 20 MIN: CPT | Performed by: OBSTETRICS & GYNECOLOGY

## 2023-02-14 PROCEDURE — 84703 CHORIONIC GONADOTROPIN ASSAY: CPT

## 2023-02-14 PROCEDURE — 87491 CHLMYD TRACH DNA AMP PROBE: CPT

## 2023-02-14 PROCEDURE — 3078F DIAST BP <80 MM HG: CPT | Performed by: OBSTETRICS & GYNECOLOGY

## 2023-02-14 PROCEDURE — 36415 COLL VENOUS BLD VENIPUNCTURE: CPT

## 2023-02-14 RX ORDER — FLUCONAZOLE 150 MG/1
TABLET ORAL
Qty: 2 TABLET | Refills: 0 | Status: SHIPPED | OUTPATIENT
Start: 2023-02-14

## 2023-02-14 NOTE — TELEPHONE ENCOUNTER
ES pt. States she's been spotting for a month while on ocps. Pt states she's been on ocps for years but this spotting just started recently. States in her last pack she thinks she might have \"doubled up\" and taken 3 pills in one day. States she will do this if she misses a pill or 2. Pt states she finished that pack she was still bleeding so she skipped the placebo pills and went straight to the next pack. Pt is still bleeding. Pt states she's been to urgent care twice and the ER. Pt states she needs to be seen. Appt made today with ES. Other

## 2023-02-14 NOTE — TELEPHONE ENCOUNTER
Patient missed her appointment due to traffic, she states she's been bleeding for a month. She want a nurse to call her to see if she can be squeezed into an appointment . Chriss Newberry ...

## 2023-02-15 ENCOUNTER — TELEPHONE (OUTPATIENT)
Dept: OBGYN CLINIC | Facility: CLINIC | Age: 33
End: 2023-02-15

## 2023-02-15 LAB
C TRACH DNA SPEC QL NAA+PROBE: NEGATIVE
N GONORRHOEA DNA SPEC QL NAA+PROBE: NEGATIVE

## 2023-02-19 ENCOUNTER — TELEPHONE (OUTPATIENT)
Dept: OBGYN CLINIC | Facility: CLINIC | Age: 33
End: 2023-02-19

## 2023-02-19 NOTE — TELEPHONE ENCOUNTER
Patient presented to [facility] office 02-14 to be seen for exact problem she saw Dr Carina Beckman for earlier in day. I did not see her. This encounter was supposed to be canceled as erroneous by the . That did not happen. Can our staff fix this as she was not seen. Thanks.

## 2023-05-22 ENCOUNTER — TELEPHONE (OUTPATIENT)
Dept: OBGYN CLINIC | Facility: CLINIC | Age: 33
End: 2023-05-22

## 2023-05-22 NOTE — TELEPHONE ENCOUNTER
Refill request received via fax from [facility] for pt's OCP, cryselle. Pt's last annual was 9/15/22 with [facility] and pt was given a year supply of OCP at that visit. Refill request faxed back to [facility] denied. Patient should have refills available.

## 2024-01-11 ENCOUNTER — OFFICE VISIT (OUTPATIENT)
Dept: OBGYN CLINIC | Facility: CLINIC | Age: 34
End: 2024-01-11

## 2024-01-11 VITALS
WEIGHT: 176.81 LBS | HEART RATE: 96 BPM | BODY MASS INDEX: 29 KG/M2 | SYSTOLIC BLOOD PRESSURE: 119 MMHG | DIASTOLIC BLOOD PRESSURE: 75 MMHG

## 2024-01-11 DIAGNOSIS — Z01.419 WELL WOMAN EXAM WITH ROUTINE GYNECOLOGICAL EXAM: Primary | ICD-10-CM

## 2024-01-11 DIAGNOSIS — N92.0 MENORRHAGIA WITH REGULAR CYCLE: ICD-10-CM

## 2024-01-11 PROCEDURE — 3078F DIAST BP <80 MM HG: CPT | Performed by: OBSTETRICS & GYNECOLOGY

## 2024-01-11 PROCEDURE — 3074F SYST BP LT 130 MM HG: CPT | Performed by: OBSTETRICS & GYNECOLOGY

## 2024-01-11 PROCEDURE — 99395 PREV VISIT EST AGE 18-39: CPT | Performed by: OBSTETRICS & GYNECOLOGY

## 2024-01-11 RX ORDER — FLUCONAZOLE 150 MG/1
150 TABLET ORAL ONCE
Qty: 1 TABLET | Refills: 3 | Status: SHIPPED | OUTPATIENT
Start: 2024-01-11 | End: 2024-01-11

## 2024-01-11 RX ORDER — DROSPIRENONE AND ETHINYL ESTRADIOL 0.03MG-3MG
1 KIT ORAL DAILY
Qty: 84 TABLET | Refills: 4 | Status: SHIPPED | OUTPATIENT
Start: 2024-01-11 | End: 2025-01-10

## 2024-01-11 NOTE — PROGRESS NOTES
Lennie Hunt is a 33 year old female  Patient's last menstrual period was 2023 (approximate).   Chief Complaint   Patient presents with    Physical     Annual w pap/ BC refill pt request  change to OCP affecting her menses. Per pt possible yeast infection   Presenting for well woman exam. Last pap smear was normal 2021. Has been having prolonged menses for the past year with consistent use of OCP. She reports occasional yeast infections and would like a prescription for Diflucan.     OBSTETRICS HISTORY:  OB History    Para Term  AB Living   4 2 2 0 2 2   SAB IAB Ectopic Multiple Live Births   0 0 0 0 2       GYNE HISTORY:  Patient's last menstrual period was 2023 (approximate).    History   Sexual Activity    Sexual activity: Not on file        Pap Date: 21  Pap Result Notes: PAP NEG/HPV NEG      MEDICAL HISTORY:  Past Medical History:   Diagnosis Date    Chlamydia     Decorative tattoo          SURGICAL HISTORY:  Past Surgical History:   Procedure Laterality Date    D & C       &        SOCIAL HISTORY:  Social History     Socioeconomic History    Marital status: Single     Spouse name: Not on file    Number of children: Not on file    Years of education: Not on file    Highest education level: Not on file   Occupational History    Not on file   Tobacco Use    Smoking status: Never    Smokeless tobacco: Never   Vaping Use    Vaping Use: Never used   Substance and Sexual Activity    Alcohol use: Not Currently     Comment: Socially, prior to pregnancy    Drug use: Not Currently     Frequency: 7.0 times per week     Types: Cannabis     Comment: Stopped when she found out she was pregnant    Sexual activity: Not on file   Other Topics Concern    Not on file   Social History Narrative    Not on file     Social Determinants of Health     Financial Resource Strain: Not on file   Food Insecurity: Not on file   Transportation Needs: Not on file   Physical Activity: Not  on file   Stress: Not on file   Social Connections: Not on file   Housing Stability: Not on file         Depression Screening (PHQ-2/PHQ-9): Over the LAST 2 WEEKS   Little interest or pleasure in doing things (over the last two weeks)?: Not at all    Feeling down, depressed, or hopeless (over the last two weeks)?: Not at all    PHQ-2 SCORE: 0           MEDICATIONS:    Current Outpatient Medications:     drospirenone-ethinyl estradiol 3-0.03 MG Oral Tab, Take 1 tablet by mouth daily., Disp: 84 tablet, Rfl: 4    fluconazole (DIFLUCAN) 150 MG Oral Tab, Repeat in 2-3 days if symptoms dont improve (Patient not taking: Reported on 1/11/2024), Disp: 2 tablet, Rfl: 0    ALLERGIES:    Allergies   Allergen Reactions    Iodine (Topical) HIVES    Shellfish-Derived Products HIVES         Review of Systems:  Review of Systems   All other systems reviewed and are negative.       Vitals:    01/11/24 1441   BP: 119/75   Pulse: 96       PHYSICAL EXAM:   Physical Exam  Vitals reviewed.   Constitutional:       Appearance: Normal appearance.   HENT:      Head: Atraumatic.   Eyes:      Pupils: Pupils are equal, round, and reactive to light.   Pulmonary:      Effort: Pulmonary effort is normal.   Chest:   Breasts:     Right: Normal. No bleeding, inverted nipple, mass, nipple discharge, skin change or tenderness.      Left: Normal. No bleeding, inverted nipple, mass, nipple discharge, skin change or tenderness.   Abdominal:      General: Abdomen is flat.      Palpations: Abdomen is soft.      Tenderness: There is no abdominal tenderness.   Genitourinary:     General: Normal vulva.      Exam position: Lithotomy position.      Labia:         Right: No rash, tenderness, lesion or injury.         Left: No rash, tenderness, lesion or injury.       Vagina: Normal.      Cervix: Normal.      Uterus: Normal. Not tender.       Adnexa: Right adnexa normal and left adnexa normal.        Right: No tenderness or fullness.          Left: No tenderness or  fullness.     Lymphadenopathy:      Upper Body:      Right upper body: No supraclavicular, axillary or pectoral adenopathy.      Left upper body: No supraclavicular, axillary or pectoral adenopathy.   Skin:     General: Skin is warm and dry.   Neurological:      General: No focal deficit present.      Mental Status: She is alert and oriented to person, place, and time.   Psychiatric:         Mood and Affect: Mood normal.         Behavior: Behavior normal.         Thought Content: Thought content normal.         Judgment: Judgment normal.           Assessment & Plan:  Lennie was seen today for physical.    Diagnoses and all orders for this visit:    Well woman exam with routine gynecological exam    Menorrhagia with regular cycle    Other orders  -     drospirenone-ethinyl estradiol 3-0.03 MG Oral Tab; Take 1 tablet by mouth daily.  -     fluconazole (DIFLUCAN) 150 MG Oral Tab; Take 1 tablet (150 mg total) by mouth once for 1 dose.        Requested Prescriptions     Signed Prescriptions Disp Refills    drospirenone-ethinyl estradiol 3-0.03 MG Oral Tab 84 tablet 4     Sig: Take 1 tablet by mouth daily.    fluconazole (DIFLUCAN) 150 MG Oral Tab 1 tablet 3     Sig: Take 1 tablet (150 mg total) by mouth once for 1 dose.       Next pap smear due next year. Encourage SBE. OCP change due to uncontrolled menorrhagia on current OCP. Diflucan Rx sent. Recommend exams yearly.

## 2024-05-29 RX ORDER — DROSPIRENONE AND ETHINYL ESTRADIOL 0.03MG-3MG
1 KIT ORAL DAILY
Qty: 84 TABLET | Refills: 4 | OUTPATIENT
Start: 2024-05-29 | End: 2025-05-29

## 2024-05-29 NOTE — TELEPHONE ENCOUNTER
drospirenone-ethinyl estradiol 3-0.03 MG Oral Tab 84 tablet 4 1/11/2024 1/10/2025    Sig - Route: Take 1 tablet by mouth daily. - Oral    Sent to pharmacy as: Drospirenone-Ethinyl Estradiol 3-0.03 MG Oral Tablet (YESSENIA)    Notes to Pharmacy: Each dispense pack contains 28 tablets.    E-Prescribing Status: Receipt confirmed by pharmacy (1/11/2024  6:21 PM CST)      Refill denied, not appropriate

## 2024-12-11 ENCOUNTER — OFFICE VISIT (OUTPATIENT)
Dept: OBGYN CLINIC | Facility: CLINIC | Age: 34
End: 2024-12-11
Payer: MEDICAID

## 2024-12-11 ENCOUNTER — LAB ENCOUNTER (OUTPATIENT)
Dept: LAB | Age: 34
End: 2024-12-11
Attending: NURSE PRACTITIONER
Payer: MEDICAID

## 2024-12-11 VITALS
DIASTOLIC BLOOD PRESSURE: 65 MMHG | WEIGHT: 163 LBS | BODY MASS INDEX: 27.16 KG/M2 | SYSTOLIC BLOOD PRESSURE: 123 MMHG | HEIGHT: 65 IN | HEART RATE: 106 BPM

## 2024-12-11 DIAGNOSIS — N89.8 VAGINAL DISCHARGE: Primary | ICD-10-CM

## 2024-12-11 DIAGNOSIS — Z11.3 SCREENING EXAMINATION FOR STI: ICD-10-CM

## 2024-12-11 LAB
B-HCG SERPL-ACNC: <2.6 MIU/ML
HBV SURFACE AG SER-ACNC: <0.1 [IU]/L
HBV SURFACE AG SERPL QL IA: NONREACTIVE
HCV AB SERPL QL IA: NONREACTIVE
T PALLIDUM AB SER QL IA: NONREACTIVE

## 2024-12-11 PROCEDURE — 99213 OFFICE O/P EST LOW 20 MIN: CPT | Performed by: NURSE PRACTITIONER

## 2024-12-11 PROCEDURE — 84702 CHORIONIC GONADOTROPIN TEST: CPT

## 2024-12-11 PROCEDURE — 87340 HEPATITIS B SURFACE AG IA: CPT

## 2024-12-11 PROCEDURE — 86780 TREPONEMA PALLIDUM: CPT

## 2024-12-11 PROCEDURE — 86803 HEPATITIS C AB TEST: CPT

## 2024-12-11 PROCEDURE — 36415 COLL VENOUS BLD VENIPUNCTURE: CPT

## 2024-12-11 PROCEDURE — 87389 HIV-1 AG W/HIV-1&-2 AB AG IA: CPT

## 2024-12-11 NOTE — PROGRESS NOTES
[facility]   Obstetrics and Gynecology    Lennie Hunt is a 34 year old female  Patient's last menstrual period was 2024 (approximate).   Chief Complaint   Patient presents with    Gyn Exam      VAGINAL DISCHARGE   .   Vaginal discharge - thicker white and also notices ovulation discharge. Some itching also = started a month ago.  She reports had a new partner a month ago.    Stopped birth control 2 weeks ago. Desires pregnancy test  Pap: NILM  Contraception: none    OBSTETRICS HISTORY:  OB History    Para Term  AB Living   4 2 2 0 2 2   SAB IAB Ectopic Multiple Live Births   0 0 0 0 2       GYNE HISTORY:  Period Cycle (Days): MONTHLY (2024 10:29 AM)  Period Duration (Days): 7-14 days (2024 10:29 AM)  Period Flow: varies (mostly heavy) (2024 10:29 AM)  Use of Birth Control (if yes, specify type): OCP (2024 10:29 AM)  Date When Birth Control Last Used: 1/10/24 (2024  2:39 PM)  Pap Date: 21 (2024 10:29 AM)  Pap Result Notes: PAP NEG/HPV NEG (2024 10:29 AM)      History   Sexual Activity    Sexual activity: Not on file       MEDICAL HISTORY:  Past Medical History:    Chlamydia    Decorative tattoo       SOCIAL HISTORY:  Social History     Socioeconomic History    Marital status: Single     Spouse name: Not on file    Number of children: Not on file    Years of education: Not on file    Highest education level: Not on file   Occupational History    Not on file   Tobacco Use    Smoking status: Never    Smokeless tobacco: Never   Vaping Use    Vaping status: Never Used   Substance and Sexual Activity    Alcohol use: Not Currently     Comment: Socially, prior to pregnancy    Drug use: Not Currently     Frequency: 7.0 times per week     Types: Cannabis     Comment: Stopped when she found out she was pregnant    Sexual activity: Not on file   Other Topics Concern    Not on file   Social History Narrative    Not on file     Social  Drivers of Health     Financial Resource Strain: Not on file   Food Insecurity: Not on file   Transportation Needs: Not on file   Physical Activity: Not on file   Stress: Not on file   Social Connections: Not on file   Housing Stability: Not on file       MEDICATIONS:    Current Outpatient Medications:     drospirenone-ethinyl estradiol 3-0.03 MG Oral Tab, Take 1 tablet by mouth daily., Disp: 84 tablet, Rfl: 4    ALLERGIES:  Allergies[1]      Review of Systems:  Constitutional:  Denies fatigue, night sweats, hot flashes  Cardiovascular:  denies chest pain or palpitations  Respiratory:  denies shortness of breath  Gastrointestinal:  denies heartburn, abdominal pain, diarrhea or constipation  Genitourinary:  see HPI  Musculoskeletal:  denies back pain.  Skin/Breast:  Denies any breast pain, lumps, or discharge.   Neurological:  denies headaches, extremity weakness or numbness.  Psychiatric: denies depression or anxiety.  Endocrine:   denies excessive thirst or urination.  Heme/Lymph:  denies history of anemia, easy bruising or bleeding.      PHYSICAL EXAM:     Vitals:    12/11/24 1029   BP: 123/65   Pulse: 106   Weight: 163 lb (73.9 kg)   Height: 5' 5\" (1.651 m)     Body mass index is 27.12 kg/m².     Patient offered chaperone, patient declined    Constitutional: well developed, well nourished    Psychiatric:  Oriented to time, place, person and situation. Appropriate mood and affect    Pelvic Exam:  External Genitalia: normal appearance, hair distribution, and no lesions  Urethral Meatus:  normal in size, location, without lesions and prolapse  Bladder:  No fullness, masses or tenderness  Vagina:  Normal appearance without lesions, no abnormal discharge  Cervix:  Normal without tenderness on motion  Uterus: normal in size, contour, position, mobility, without tenderness  Adnexa: normal without masses or tenderness  Perineum: normal  Anus: no hemorroids   Lymph node: no inguinal lymph nodes    Assessment &  Plan:  eLnnie was seen today for gyn exam.    Diagnoses and all orders for this visit:    Vaginal discharge  -     Vaginitis Vaginosis PCR Panel; Future  -     Chlamydia/Gc Amplification; Future    Screening examination for STI  -     Chlamydia/Gc Amplification; Future  -     HCV Antibody; Future  -     Hepatitis B Surface Antigen; Future  -     HIV Ag/Ab Combo; Future  -     T Pallidum Screening Cascade; Future  -     HCG, Beta Subunit (Quant Pregnancy Test); Future      Culture done  Desires all sti testing  Desires pregnancy test    Rto 1-2 months for annual and pap    EARL Coronado    This note was prepared using Dragon Medical voice recognition dictation software. As a result errors may occur. When identified these errors have been corrected. While every attempt is made to correct errors during dictation discrepancies may still exist.         [1]   Allergies  Allergen Reactions    Iodine (Topical) HIVES    Shellfish-Derived Products HIVES

## 2024-12-12 LAB
C TRACH DNA SPEC QL NAA+PROBE: NEGATIVE
N GONORRHOEA DNA SPEC QL NAA+PROBE: NEGATIVE

## 2025-01-02 ENCOUNTER — TELEPHONE (OUTPATIENT)
Dept: OBGYN CLINIC | Facility: CLINIC | Age: 35
End: 2025-01-02

## 2025-01-02 RX ORDER — DROSPIRENONE AND ETHINYL ESTRADIOL 0.03MG-3MG
1 KIT ORAL DAILY
Qty: 84 TABLET | Refills: 0 | Status: SHIPPED | OUTPATIENT
Start: 2025-01-02

## 2025-01-02 RX ORDER — FLUCONAZOLE 150 MG/1
150 TABLET ORAL ONCE
Qty: 1 TABLET | Refills: 0 | Status: SHIPPED | OUTPATIENT
Start: 2025-01-02 | End: 2025-01-02

## 2025-01-02 NOTE — TELEPHONE ENCOUNTER
Patient recently in on 12/11/24 and was to be prescribed b/c and something for yeast infection pill (diflucan)    But nothing at pharmacy  Confirmed Terri is correct.    Pls advise patient when sent to the pharmacy

## 2025-01-02 NOTE — TELEPHONE ENCOUNTER
If she wants to restart birth control we can send in her prescription that dr. Madrid sent at her annual-    Medication Quantity Refills Start End   drospirenone-ethinyl estradiol 3-0.03 MG Oral Tab 84 tablet 4 1/11/2024 1/10/2025   Sig:   Take 1 tablet by mouth daily.     Route:   Oral     Note to Pharmacy:   Each dispense pack contains 28 tablets.         Regarding vaginal culture - not sure what happened - if she is having itching can prescription diflucan 150 mg x 1. If symptoms not resolved - offer to reculture - no charge for office visit    Will be due for annual this month

## 2025-01-02 NOTE — TELEPHONE ENCOUNTER
Left message to call back    To Korina Bowen, please advise on refill. Office notes indicate patient stopped Oral contraceptive and vaginitis culture states 'Active-Future\". Thank you.

## 2025-01-02 NOTE — TELEPHONE ENCOUNTER
Lennie notified that prescription sent for ocp and fluconazole.  She will let us know if itching does not resolve.  Offered to schedule annual. She will check schedule and call us back.

## 2025-04-14 ENCOUNTER — LAB ENCOUNTER (OUTPATIENT)
Dept: LAB | Facility: HOSPITAL | Age: 35
End: 2025-04-14
Attending: NURSE PRACTITIONER
Payer: MEDICAID

## 2025-04-14 ENCOUNTER — OFFICE VISIT (OUTPATIENT)
Dept: OBGYN CLINIC | Facility: CLINIC | Age: 35
End: 2025-04-14
Payer: MEDICAID

## 2025-04-14 VITALS
HEART RATE: 87 BPM | DIASTOLIC BLOOD PRESSURE: 79 MMHG | WEIGHT: 161.81 LBS | BODY MASS INDEX: 27 KG/M2 | SYSTOLIC BLOOD PRESSURE: 127 MMHG

## 2025-04-14 DIAGNOSIS — Z11.3 SCREENING EXAMINATION FOR STI: ICD-10-CM

## 2025-04-14 DIAGNOSIS — Z12.4 SCREENING FOR CERVICAL CANCER: ICD-10-CM

## 2025-04-14 DIAGNOSIS — Z01.419 WELL WOMAN EXAM WITH ROUTINE GYNECOLOGICAL EXAM: Primary | ICD-10-CM

## 2025-04-14 DIAGNOSIS — Z30.41 ORAL CONTRACEPTIVE PILL SURVEILLANCE: ICD-10-CM

## 2025-04-14 LAB
HBV SURFACE AG SER-ACNC: <0.1 [IU]/L
HBV SURFACE AG SERPL QL IA: NONREACTIVE
HCV AB SERPL QL IA: NONREACTIVE
T PALLIDUM AB SER QL IA: NONREACTIVE

## 2025-04-14 PROCEDURE — 87491 CHLMYD TRACH DNA AMP PROBE: CPT | Performed by: NURSE PRACTITIONER

## 2025-04-14 PROCEDURE — 36415 COLL VENOUS BLD VENIPUNCTURE: CPT

## 2025-04-14 PROCEDURE — 86803 HEPATITIS C AB TEST: CPT

## 2025-04-14 PROCEDURE — 87661 TRICHOMONAS VAGINALIS AMPLIF: CPT | Performed by: NURSE PRACTITIONER

## 2025-04-14 PROCEDURE — 87389 HIV-1 AG W/HIV-1&-2 AB AG IA: CPT

## 2025-04-14 PROCEDURE — 87624 HPV HI-RISK TYP POOLED RSLT: CPT | Performed by: NURSE PRACTITIONER

## 2025-04-14 PROCEDURE — 86780 TREPONEMA PALLIDUM: CPT

## 2025-04-14 PROCEDURE — 87340 HEPATITIS B SURFACE AG IA: CPT

## 2025-04-14 PROCEDURE — 87591 N.GONORRHOEAE DNA AMP PROB: CPT | Performed by: NURSE PRACTITIONER

## 2025-04-14 NOTE — PROGRESS NOTES
The following individual(s) verbally consented to be recorded using ambient AI listening technology and understand that they can each withdraw their consent to this listening technology at any point by asking the clinician to turn off or pause the recording:    Patient name: Lennie Hunt  Additional names:

## 2025-04-14 NOTE — PROGRESS NOTES
[facility]    Obstetrics and Gynecology    Chief Complaint   Patient presents with    Gyn Exam     ANNUAL EXAM // STD TESTING        Lennie Hunt is a 34 year old female  Patient's last menstrual period was 2025 (exact date). presenting for annual gynecology exam.  Last seen 2024. Stopped ocps in December.  History of Present Illness  The patient presents for an annual physical and STD testing. She reports regular sexual activity with a known partner and desires routine testing for reassurance. She denies any abnormal symptoms, pain, or bleeding with intercourse.    The patient has stopped taking birth control pills and reports using them sporadically as a form of emergency contraception. She reports regular menstrual cycles lasting five to seven days with occasional cramping. She expresses interest in restarting birth control with her next menstrual cycle.  The patient also reports vaping marijuana but denies use of nicotine or tobacco. She has two children, aged five and eleven. She reports regular exercise.    Pap: NILM  Contraception: none       OBSTETRICS HISTORY:  OB History    Para Term  AB Living   4 2 2 0 2 2   SAB IAB Ectopic Multiple Live Births   0 0 0 0 2       GYNE HISTORY:  Menarche: 12 YEARS OLD (2025  2:23 PM)  Period Cycle (Days): MONTHLY (2025  2:23 PM)  Period Duration (Days): 7-14 days (2025  2:23 PM)  Period Flow: HEAVY TO LIGHT (2025  2:23 PM)  Use of Birth Control (if yes, specify type): OCP (2025  2:23 PM)  Date When Birth Control Last Used: PT STATES TOOK A MONTH AGO (2025  2:23 PM)  Pap Date: 21 (2025  2:23 PM)  Pap Result Notes: PAP NEG/HPV NEG (2025  2:23 PM)  Follow Up Recommendation: ANNUAL 24 JMN (2025  2:23 PM)      History   Sexual Activity    Sexual activity: Not on file           Latest Ref Rng & Units 2021    10:10 AM 7/3/2019     9:47 AM   RECENT PAP RESULTS    INTERPRETATION/RESULT: Negative for intraepithelial lesion or malignancy Negative for intraepithelial lesion or malignancy  Negative for intraepithelial lesion or malignancy    HPV Negative Negative           MEDICAL HISTORY:  Past Medical History[1]  Past Surgical History[2]    SOCIAL HISTORY:  Social History     Socioeconomic History    Marital status: Single     Spouse name: Not on file    Number of children: Not on file    Years of education: Not on file    Highest education level: Not on file   Occupational History    Not on file   Tobacco Use    Smoking status: Never    Smokeless tobacco: Never   Vaping Use    Vaping status: Never Used   Substance and Sexual Activity    Alcohol use: Not Currently     Comment: Socially, prior to pregnancy    Drug use: Not Currently     Frequency: 7.0 times per week     Types: Cannabis     Comment: Stopped when she found out she was pregnant    Sexual activity: Not on file   Other Topics Concern    Not on file   Social History Narrative    Not on file     Social Drivers of Health     Food Insecurity: Not on file   Transportation Needs: Not on file   Stress: Not on file   Housing Stability: Not on file         Depression Screening (PHQ-2/PHQ-9): Over the LAST 2 WEEKS   Little interest or pleasure in doing things (over the last two weeks)?: Not at all    Feeling down, depressed, or hopeless (over the last two weeks)?: Not at all    PHQ-2 SCORE: 0           FAMILY HISTORY:  Family History[3]    MEDICATIONS:  Medications - Current[4]    ALLERGIES:  Allergies[5]      Review of Systems:  Constitutional:  Denies fatigue, night sweats, hot flashes  Eyes:  denies blurred or double vision  Cardiovascular:  denies chest pain or palpitations  Respiratory:  denies shortness of breath  Gastrointestinal:  denies heartburn, abdominal pain, diarrhea or constipation  Genitourinary:  denies dysuria, incontinence, abnormal vaginal discharge, vaginal itching,   Musculoskeletal:  denies back pain    Skin/Breast:  Denies any breast pain, lumps, or discharge.   Neurological:  denies headaches, extremity weakness or numbness.  Psychiatric: denies depression or anxiety.  Endocrine:   denies excessive thirst or urination.  Heme/Lymph:  denies history of anemia, easy bruising or bleeding.      PHYSICAL EXAM:     Vitals:    04/14/25 1424   BP: 127/79   Pulse: 87   Weight: 161 lb 12.8 oz (73.4 kg)       Body mass index is 26.92 kg/m².     Patient offered chaperone, patient declined    Constitutional: well developed, well nourished  Psychiatric:  Oriented to time, place, person and situation. Appropriate mood and affect  Head/Face: normocephalic  Neck/Thyroid: thyroid symmetric, no thyromegaly, no nodules, no adenopathy  Lymphatic:no abnormal supraclavicular or axillary adenopathy is noted  Breast: normal without palpable masses, tenderness, asymmetry, nipple discharge, nipple retraction or skin changes  Abdomen:  soft, nontender, nondistended, no masses  Skin/Hair: no unusual rashes or bruises  Extremities: no edema, no cyanosis    Pelvic Exam:  External Genitalia: normal appearance, hair distribution, and no lesions  Urethral Meatus:  normal in size, location, without lesions and prolapse  Bladder:  No fullness, masses or tenderness  Vagina:  Normal appearance without lesions, no abnormal discharge  Cervix:  Normal without tenderness on motion  Uterus: normal in size, contour, position, mobility, without tenderness  Adnexa: normal without masses or tenderness  Perineum: normal  Anus: no hemorroids     Assessment & Plan:    ICD-10-CM    1. Well woman exam with routine gynecological exam  Z01.419       2. Screening for cervical cancer  Z12.4 ThinPrep PAP Smear     Hpv Dna  High Risk , Thin Prep Collect      3. Screening examination for STI  Z11.3 HIV Ag/Ab Combo     Hepatitis B Surface Antigen     HCV Antibody     T Pallidum Screening Cascade     Chlamydia/Gc Amplification     Trichomonas vaginalis, OWEN  (Vaginal/Cervical)      4. Oral contraceptive pill surveillance  Z30.41          Assessment & Plan  WWE  - pap done today  -SBE reviewed  Desires STI Testing    Contraceptive Counseling  Reviewed to not use random ocps as emergency contraception - recommend plan B if doesn't restart ocps. She is considering restarting regular birth control.  Plan B recommended for emergency contraception.  - Advise restarting drospirenone-ethinyl estradiol with current menstrual cycle for regular contraception.  - Educate on proper use of Plan B and its effectiveness.  - Discuss potential side effects of birth control pills, including severe headaches, chest pressure, shortness of breath, leg pain, or swelling.  - Advise discontinuing vaping for health benefits.    General Health Maintenance  - Encourage scheduling an appointment with a primary care provider for general health maintenance and routine labs.    Discussed diet, exercise, MVIs with Ca/Vit D  Follow up in 1 yr for WWE    EARL Coronado      This note was prepared using Dragon Medical voice recognition dictation software. As a result errors may occur. When identified these errors have been corrected. While every attempt is made to correct errors during dictation discrepancies may still exist.         [1]   Past Medical History:   Chlamydia    Decorative tattoo   [2]   Past Surgical History:  Procedure Laterality Date    D & c      2015 & 2016   [3]   Family History  Problem Relation Age of Onset    Diabetes Father     Heart Disorder Father         STROKE    Diabetes Mother     Cancer Maternal Grandmother     Other (Other) Maternal Grandmother         THYROID DISEASE    Cancer Other     Diabetes Other     Other (Other) Other         BRAIN TUMOR   [4]   Current Outpatient Medications:     drospirenone-ethinyl estradiol 3-0.03 MG Oral Tab, Take 1 tablet by mouth daily., Disp: 84 tablet, Rfl: 0  [5]   Allergies  Allergen Reactions    Iodine (Topical) HIVES     Shellfish-Derived Products HIVES

## 2025-04-15 LAB
C TRACH DNA SPEC QL NAA+PROBE: NEGATIVE
HPV E6+E7 MRNA CVX QL NAA+PROBE: NEGATIVE
N GONORRHOEA DNA SPEC QL NAA+PROBE: NEGATIVE

## 2025-04-16 LAB — T VAGINALIS RRNA SPEC QL NAA+PROBE: NEGATIVE

## 2025-06-23 ENCOUNTER — LAB ENCOUNTER (OUTPATIENT)
Dept: LAB | Facility: HOSPITAL | Age: 35
End: 2025-06-23
Attending: NURSE PRACTITIONER
Payer: MEDICAID

## 2025-06-23 ENCOUNTER — OFFICE VISIT (OUTPATIENT)
Dept: OBGYN CLINIC | Facility: CLINIC | Age: 35
End: 2025-06-23

## 2025-06-23 VITALS
WEIGHT: 161 LBS | BODY MASS INDEX: 27 KG/M2 | DIASTOLIC BLOOD PRESSURE: 71 MMHG | SYSTOLIC BLOOD PRESSURE: 106 MMHG | HEART RATE: 76 BPM

## 2025-06-23 DIAGNOSIS — N92.6 MISSED MENSES: Primary | ICD-10-CM

## 2025-06-23 DIAGNOSIS — Z11.3 SCREENING EXAMINATION FOR STI: ICD-10-CM

## 2025-06-23 DIAGNOSIS — N89.8 VAGINAL DISCHARGE: ICD-10-CM

## 2025-06-23 LAB
CONTROL LINE PRESENT WITH A CLEAR BACKGROUND (YES/NO): YES YES/NO
HBV SURFACE AG SER-ACNC: 0.12 [IU]/L
HBV SURFACE AG SERPL QL IA: NONREACTIVE
HCV AB SERPL QL IA: NONREACTIVE
KIT LOT #: NORMAL NUMERIC
T PALLIDUM AB SER QL IA: NONREACTIVE

## 2025-06-23 PROCEDURE — 87340 HEPATITIS B SURFACE AG IA: CPT

## 2025-06-23 PROCEDURE — 81025 URINE PREGNANCY TEST: CPT | Performed by: NURSE PRACTITIONER

## 2025-06-23 PROCEDURE — 86803 HEPATITIS C AB TEST: CPT

## 2025-06-23 PROCEDURE — 87389 HIV-1 AG W/HIV-1&-2 AB AG IA: CPT

## 2025-06-23 PROCEDURE — 87563 M. GENITALIUM AMP PROBE: CPT

## 2025-06-23 PROCEDURE — 99213 OFFICE O/P EST LOW 20 MIN: CPT | Performed by: NURSE PRACTITIONER

## 2025-06-23 PROCEDURE — 86780 TREPONEMA PALLIDUM: CPT

## 2025-06-23 PROCEDURE — 36415 COLL VENOUS BLD VENIPUNCTURE: CPT

## 2025-06-23 NOTE — PROGRESS NOTES
[facility]   Obstetrics and Gynecology    Lennie Hunt is a 34 year old female  Patient's last menstrual period was 2025 (approximate).   Chief Complaint   Patient presents with    Problem     STD AND BV TESTING // UPT   ITCHINESS , WHITE THICK DISCHARGE    . Here for gyne problem. Seen 2025 at urgent care and treated for UTI, Mycoplasma Genitalium and yeast.  History of Present Illness  She experiences intermittent abdominal pain, itching, discharge, and cramping for a few days, consistent with a yeast infection. She has thick discharge.     Her partner tested negative for mycoplasma and did not receive treatment. She has been sexually active since treatment without using condoms.    She was taking Azo, a probiotic, but has not been taking it recently. There has been no recent use of antibiotics or changes in soaps or detergents. Her UTI symptoms have resolved. She has not had her period since May 25th.      Some symptoms like yeast. Urine pregnancy test today negative.    Pap:2025 NILM  Contraception: withdrawl    OBSTETRICS HISTORY:  OB History    Para Term  AB Living   4 2 2 0 2 2   SAB IAB Ectopic Multiple Live Births   0 0 0 0 2       GYNE HISTORY:  Menarche: 12 YEARS OLD (2025  8:49 AM)  Period Cycle (Days): MONTHLY (2025  8:49 AM)  Period Duration (Days): 5-7DAYS (2025  8:49 AM)  Period Flow: MODARTE (2025  8:49 AM)  Use of Birth Control (if yes, specify type): Withdrawal (2025  8:49 AM)  Date When Birth Control Last Used: PT STATES TOOK A MONTH AGO (2025  2:23 PM)  Pap Date: 25 (2025  8:49 AM)  Pap Result Notes: PAP NEG/HPV NEG (2025  8:49 AM)  Follow Up Recommendation: ANNUAL 25 EMB (2025  8:49 AM)      History   Sexual Activity    Sexual activity: Not on file       MEDICAL HISTORY:  Past Medical History:    Chlamydia    Decorative tattoo       SOCIAL HISTORY:  Social History     Socioeconomic History     Marital status: Single     Spouse name: Not on file    Number of children: Not on file    Years of education: Not on file    Highest education level: Not on file   Occupational History    Not on file   Tobacco Use    Smoking status: Never    Smokeless tobacco: Never   Vaping Use    Vaping status: Never Used   Substance and Sexual Activity    Alcohol use: Not Currently     Comment: Socially, prior to pregnancy    Drug use: Not Currently     Frequency: 7.0 times per week     Types: Cannabis     Comment: Stopped when she found out she was pregnant    Sexual activity: Not on file   Other Topics Concern    Not on file   Social History Narrative    Not on file     Social Drivers of Health     Food Insecurity: Not on file   Transportation Needs: Not on file   Stress: Not on file   Housing Stability: Not on file       MEDICATIONS:    Current Outpatient Medications:     drospirenone-ethinyl estradiol 3-0.03 MG Oral Tab, Take 1 tablet by mouth daily. (Patient not taking: Reported on 6/23/2025), Disp: 84 tablet, Rfl: 0    ALLERGIES:    Allergies   Allergen Reactions    Iodine (Topical) HIVES    Shellfish-Derived Products HIVES         Review of Systems:  Constitutional:  Denies fatigue, night sweats, hot flashes  Cardiovascular:  denies chest pain or palpitations  Respiratory:  denies shortness of breath  Gastrointestinal:  denies heartburn, abdominal pain, diarrhea or constipation  Genitourinary:  denies dysuria, incontinence, abnormal vaginal discharge, vaginal itching   Musculoskeletal:  denies back pain.  Skin/Breast:  Denies any breast pain, lumps, or discharge.   Neurological:  denies headaches, extremity weakness or numbness.  Psychiatric: denies depression or anxiety.  Endocrine:   denies excessive thirst or urination.  Heme/Lymph:  denies history of anemia, easy bruising or bleeding.      PHYSICAL EXAM:     Vitals:    06/23/25 0844   BP: 106/71   Pulse: 76   Weight: 161 lb (73 kg)     Body mass index is 26.79  kg/m².     Patient offered chaperone, patient declined    Constitutional: well developed, well nourished    Psychiatric:  Oriented to time, place, person and situation. Appropriate mood and affect    Pelvic Exam:  External Genitalia: normal appearance, hair distribution, and no lesions  Urethral Meatus:  normal in size, location, without lesions and prolapse  Bladder:  No fullness, masses or tenderness  Vagina:  Normal appearance without lesions, no abnormal discharge  Cervix:  Normal without tenderness on motion  Uterus: normal in size, contour, position, mobility, without tenderness  Adnexa: normal without masses or tenderness  Perineum: normal  Anus: no hemorroids   Lymph node: no inguinal lymph nodes      Assessment & Plan:    ICD-10-CM    1. Missed menses  N92.6 Urine Pregnancy Test      2. Vaginal discharge  N89.8 Vaginitis Vaginosis PCR Panel      3. Screening examination for STI  Z11.3 Mycoplasma genitalium, OWEN, Urine     HIV Ag/Ab Combo     Hepatitis B Surface Antigen     HCV Antibody     T Pallidum Screening Cascade     Chlamydia/Gc Amplification     Trichomonas vaginalis, OWEN (Vaginal/Cervical)        Assessment & Plan  Cultures done  Desires all lab sti testing too  Repeat testing for Mycoplasma    Mycoplasma Infection  Previously treated with three medications. Partner negative and untreated. Sexually active without condom use post-treatment.  - Send urine sample to check for mycoplasma.    General Health Maintenance  Advised continuation of probiotic to support gastrointestinal health post-antibiotics.  - Advise continuation of Azo probiotic.        EARL Coronado        This note was prepared using Dragon Medical voice recognition dictation software. As a result errors may occur. When identified these errors have been corrected. While every attempt is made to correct errors during dictation discrepancies may still exist.

## 2025-06-24 ENCOUNTER — PATIENT MESSAGE (OUTPATIENT)
Dept: OBGYN CLINIC | Facility: CLINIC | Age: 35
End: 2025-06-24

## 2025-06-24 ENCOUNTER — TELEPHONE (OUTPATIENT)
Dept: OBGYN CLINIC | Facility: CLINIC | Age: 35
End: 2025-06-24

## 2025-06-24 LAB
BV BACTERIA DNA VAG QL NAA+PROBE: POSITIVE
C GLABRATA DNA VAG QL NAA+PROBE: NEGATIVE
C KRUSEI DNA VAG QL NAA+PROBE: NEGATIVE
C TRACH DNA SPEC QL NAA+PROBE: NEGATIVE
CANDIDA DNA VAG QL NAA+PROBE: NEGATIVE
N GONORRHOEA DNA SPEC QL NAA+PROBE: NEGATIVE
T VAGINALIS DNA VAG QL NAA+PROBE: NEGATIVE

## 2025-06-24 RX ORDER — METRONIDAZOLE 500 MG/1
500 TABLET ORAL 2 TIMES DAILY
Qty: 14 TABLET | Refills: 0 | Status: SHIPPED | OUTPATIENT
Start: 2025-06-24 | End: 2025-07-01

## 2025-06-24 NOTE — TELEPHONE ENCOUNTER
Patient informed of negative test results per Korina Bowen. Patient verbalized understanding. Patient also calling for bacterial vaginosis test results- please see 6/24 telephone encounter.

## 2025-06-24 NOTE — TELEPHONE ENCOUNTER
Problem visit with Korina Bowen 6/23 for itchiness and vaginal discharge.     Patient calling up to follow up on results:   Bacterial Vaginosis Positive (!)     Message to Korina Bowen for recommendations, thank you.

## 2025-06-25 LAB
MYCOPLASMA GENITALIUM NAA, URINE: NEGATIVE
MYCOPLASMA GENITALIUM NAA, URINE: NEGATIVE
T VAGINALIS RRNA SPEC QL NAA+PROBE: NEGATIVE

## 2025-07-10 ENCOUNTER — TELEPHONE (OUTPATIENT)
Dept: OBGYN CLINIC | Facility: CLINIC | Age: 35
End: 2025-07-10

## 2025-07-10 RX ORDER — FLUCONAZOLE 150 MG/1
150 TABLET ORAL ONCE
Qty: 1 TABLET | Refills: 0 | Status: SHIPPED | OUTPATIENT
Start: 2025-07-10 | End: 2025-07-10

## 2025-07-10 NOTE — TELEPHONE ENCOUNTER
Patient is having a thick, white discharge with slight itching. No burning when urinating. Hoping a prescription can be ordered. Please call.

## 2025-07-10 NOTE — TELEPHONE ENCOUNTER
Lennie with thick, white discharge and slight vaginal itching x 2 days. Denies odor.  6/23- was seen with Korina Bowen for similar symptoms, however now consistency of discharge is smooth rather than \"crumbs\". Treated with Flagyl for +bacterial vaginosis on cultures and symptoms had resolved.   5/30 - she was seen in Immediate Care and treated for UTI, mycoplasma genitalium and yeast.     Lennie requesting treatment, she suspects this is yeast. Advised message will be sent to Korina Bowen to see if able to treat based on symptoms or if appointment needed.   To Korina Bowen to advise.

## (undated) NOTE — LETTER
3/16/2020            To Whom It May Concern:    Matteo Lynn was seen in my office today. Sincerely,    Moni Marrufo.  [facility] [place],   [address] [place] [phone]

## (undated) NOTE — MR AVS SNAPSHOT
After Visit Summary   7/21/2021    Matteo Lynn    MRN: [id number]           Visit Information     Date & Time  7/21/2021  9:00 AM Provider  [facility], [address], [facility]  P 7/21/2022                [facility] now offers Video Visits through [address] for adult and pediatric patients. Video Visits are available Monday - Friday for many common conditions such as allergies, colds, cough, fever, rash, sore throat, headache and pink eye. 4:00 pm     [address]   Monday – Friday  4:00 pm – 10:00 pm   Saturday – Sunday  10:00 am – 4:00 pm  [facility]  Emergency Medicine Providers  Conditions needing urgent attention, but are   non-life-threatening.     Also available by appointment Average

## (undated) NOTE — LETTER
AUTHORIZATION FOR SURGICAL OPERATION OR OTHER PROCEDURE    1. I hereby authorize , and [facility] staff assigned to my case to perform the following operation and/or procedure at the [facility]:    IUD REMOVAL    2.   My physician h Relationship to Patient:           []  Parent    Responsible person                          []  Spouse  In case of minor or                    [] Other  _____________   Incompetent name:  __________________________________________________

## (undated) NOTE — LETTER
12/27/2019              Aman Massed        [address] [place] [address]         To Whom It May Concern,     Danny Woodard is currently my medical care. Patient is 35 weeks pregnant with a due date of 1/31/20.  If you have any quest

## (undated) NOTE — LETTER
12/27/2019              Luella Ahumada        [address] [place] [address]         To Whom It May Concern,    Cheikh Hawkins is currently under my medical care. Please excuse patient from work on 12/27/19.  Patient may return back to

## (undated) NOTE — LETTER
3/16/2020            To Whom It May Concern:    Lalo Anderson was seen in my office today. Sincerely,    [place]  [address] [place] [address], [address] [phone]

## (undated) NOTE — LETTER
VACCINE ADMINISTRATION RECORD  PARENT / GUARDIAN APPROVAL  Date: 2019  Vaccine administered to: Mayte Wick     : 1990    MRN: [id number]    A copy of the appropriate Centers for Disease Control and Prevention Vaccine Information stateme

## (undated) NOTE — ED AVS SNAPSHOT
Bk Nelson   MRN: [id number]    Department:  [facility]   Date of Visit:  1/20/2019           Disclosure     Insurance plans vary and the physician(s) referred by the ER may not be covered by your plan.  Please contact CARE PHYSICIAN AT ONCE OR RETURN IMMEDIATELY TO THE EMERGENCY DEPARTMENT. If you have been prescribed any medication(s), please fill your prescription right away and begin taking the medication(s) as directed.   If you believe that any of the medications